# Patient Record
Sex: FEMALE | Race: WHITE | NOT HISPANIC OR LATINO | Employment: FULL TIME | URBAN - METROPOLITAN AREA
[De-identification: names, ages, dates, MRNs, and addresses within clinical notes are randomized per-mention and may not be internally consistent; named-entity substitution may affect disease eponyms.]

---

## 2017-07-06 ENCOUNTER — APPOINTMENT (OUTPATIENT)
Dept: WOMENS IMAGING | Facility: HOSPITAL | Age: 57
End: 2017-07-06

## 2017-07-06 PROCEDURE — 77067 SCR MAMMO BI INCL CAD: CPT | Performed by: RADIOLOGY

## 2017-09-08 ENCOUNTER — HOSPITAL ENCOUNTER (OUTPATIENT)
Dept: SLEEP MEDICINE | Facility: HOSPITAL | Age: 57
Discharge: HOME | End: 2017-09-08
Admitting: FAMILY MEDICINE

## 2017-09-08 PROCEDURE — G0463 HOSPITAL OUTPT CLINIC VISIT: HCPCS

## 2018-06-12 ENCOUNTER — HOSPITAL ENCOUNTER (OUTPATIENT)
Facility: HOSPITAL | Age: 58
Discharge: HOME | End: 2018-06-13
Attending: INTERNAL MEDICINE | Admitting: INTERNAL MEDICINE

## 2018-06-12 ENCOUNTER — HOSPITAL ENCOUNTER (OUTPATIENT)
Dept: CARDIOLOGY | Facility: HOSPITAL | Age: 58
Discharge: HOME | End: 2018-06-12

## 2018-06-12 VITALS
BODY MASS INDEX: 39.99 KG/M2 | HEIGHT: 65 IN | OXYGEN SATURATION: 96 % | RESPIRATION RATE: 20 BRPM | DIASTOLIC BLOOD PRESSURE: 72 MMHG | WEIGHT: 240 LBS | HEART RATE: 76 BPM | SYSTOLIC BLOOD PRESSURE: 127 MMHG

## 2018-06-12 DIAGNOSIS — R07.2 PRECORDIAL PAIN: Primary | ICD-10-CM

## 2018-06-12 DIAGNOSIS — R07.2 PRECORDIAL PAIN: ICD-10-CM

## 2018-06-12 LAB
ALBUMIN SERPL-MCNC: 3.8 G/DL (ref 3.5–5.2)
ALBUMIN/GLOB SERPL: 1.1 G/DL
ALP SERPL-CCNC: 103 U/L (ref 39–117)
ALT SERPL W P-5'-P-CCNC: 13 U/L (ref 1–33)
ANION GAP SERPL CALCULATED.3IONS-SCNC: 13.4 MMOL/L
AST SERPL-CCNC: 15 U/L (ref 1–32)
BASOPHILS # BLD AUTO: 0.03 10*3/MM3 (ref 0–0.2)
BASOPHILS NFR BLD AUTO: 0.3 % (ref 0–1.5)
BILIRUB SERPL-MCNC: 0.2 MG/DL (ref 0.1–1.2)
BUN BLD-MCNC: 18 MG/DL (ref 6–20)
BUN/CREAT SERPL: 15.8 (ref 7–25)
CALCIUM SPEC-SCNC: 9.1 MG/DL (ref 8.6–10.5)
CHLORIDE SERPL-SCNC: 102 MMOL/L (ref 98–107)
CO2 SERPL-SCNC: 25.6 MMOL/L (ref 22–29)
CREAT BLD-MCNC: 1.14 MG/DL (ref 0.57–1)
D DIMER PPP FEU-MCNC: 0.36 MCGFEU/ML (ref 0–0.49)
DEPRECATED RDW RBC AUTO: 46.9 FL (ref 37–54)
EOSINOPHIL # BLD AUTO: 0.29 10*3/MM3 (ref 0–0.7)
EOSINOPHIL NFR BLD AUTO: 3.1 % (ref 0.3–6.2)
ERYTHROCYTE [DISTWIDTH] IN BLOOD BY AUTOMATED COUNT: 15.4 % (ref 11.7–13)
GFR SERPL CREATININE-BSD FRML MDRD: 49 ML/MIN/1.73
GLOBULIN UR ELPH-MCNC: 3.6 GM/DL
GLUCOSE BLD-MCNC: 111 MG/DL (ref 65–99)
HCT VFR BLD AUTO: 34.9 % (ref 35.6–45.5)
HGB BLD-MCNC: 11.4 G/DL (ref 11.9–15.5)
IMM GRANULOCYTES # BLD: 0.01 10*3/MM3 (ref 0–0.03)
IMM GRANULOCYTES NFR BLD: 0.1 % (ref 0–0.5)
LYMPHOCYTES # BLD AUTO: 2.04 10*3/MM3 (ref 0.9–4.8)
LYMPHOCYTES NFR BLD AUTO: 21.5 % (ref 19.6–45.3)
MCH RBC QN AUTO: 27.4 PG (ref 26.9–32)
MCHC RBC AUTO-ENTMCNC: 32.7 G/DL (ref 32.4–36.3)
MCV RBC AUTO: 83.9 FL (ref 80.5–98.2)
MONOCYTES # BLD AUTO: 0.7 10*3/MM3 (ref 0.2–1.2)
MONOCYTES NFR BLD AUTO: 7.4 % (ref 5–12)
NEUTROPHILS # BLD AUTO: 6.44 10*3/MM3 (ref 1.9–8.1)
NEUTROPHILS NFR BLD AUTO: 67.7 % (ref 42.7–76)
NT-PROBNP SERPL-MCNC: 136.4 PG/ML (ref 5–900)
PLATELET # BLD AUTO: 284 10*3/MM3 (ref 140–500)
PMV BLD AUTO: 10.2 FL (ref 6–12)
POTASSIUM BLD-SCNC: 4 MMOL/L (ref 3.5–5.2)
PROT SERPL-MCNC: 7.4 G/DL (ref 6–8.5)
RBC # BLD AUTO: 4.16 10*6/MM3 (ref 3.9–5.2)
SODIUM BLD-SCNC: 141 MMOL/L (ref 136–145)
TROPONIN T SERPL-MCNC: <0.01 NG/ML (ref 0–0.03)
WBC NRBC COR # BLD: 9.5 10*3/MM3 (ref 4.5–10.7)

## 2018-06-12 PROCEDURE — G0378 HOSPITAL OBSERVATION PER HR: HCPCS

## 2018-06-12 PROCEDURE — 83880 ASSAY OF NATRIURETIC PEPTIDE: CPT | Performed by: INTERNAL MEDICINE

## 2018-06-12 PROCEDURE — 94760 N-INVAS EAR/PLS OXIMETRY 1: CPT

## 2018-06-12 PROCEDURE — 85379 FIBRIN DEGRADATION QUANT: CPT | Performed by: INTERNAL MEDICINE

## 2018-06-12 PROCEDURE — 93005 ELECTROCARDIOGRAM TRACING: CPT | Performed by: INTERNAL MEDICINE

## 2018-06-12 PROCEDURE — 84484 ASSAY OF TROPONIN QUANT: CPT | Performed by: INTERNAL MEDICINE

## 2018-06-12 PROCEDURE — 36415 COLL VENOUS BLD VENIPUNCTURE: CPT

## 2018-06-12 PROCEDURE — 99220 PR INITIAL OBSERVATION CARE/DAY 70 MINUTES: CPT | Performed by: INTERNAL MEDICINE

## 2018-06-12 PROCEDURE — 85025 COMPLETE CBC W/AUTO DIFF WBC: CPT | Performed by: INTERNAL MEDICINE

## 2018-06-12 PROCEDURE — 93010 ELECTROCARDIOGRAM REPORT: CPT | Performed by: INTERNAL MEDICINE

## 2018-06-12 PROCEDURE — 80053 COMPREHEN METABOLIC PANEL: CPT | Performed by: INTERNAL MEDICINE

## 2018-06-12 RX ORDER — ASPIRIN 81 MG/1
81 TABLET ORAL ONCE
Status: COMPLETED | OUTPATIENT
Start: 2018-06-12 | End: 2018-06-12

## 2018-06-12 RX ORDER — DULOXETIN HYDROCHLORIDE 60 MG/1
60 CAPSULE, DELAYED RELEASE ORAL DAILY
Status: DISCONTINUED | OUTPATIENT
Start: 2018-06-12 | End: 2018-06-13 | Stop reason: HOSPADM

## 2018-06-12 RX ORDER — METOPROLOL SUCCINATE 25 MG/1
25 TABLET, EXTENDED RELEASE ORAL DAILY
Status: DISCONTINUED | OUTPATIENT
Start: 2018-06-12 | End: 2018-06-13 | Stop reason: HOSPADM

## 2018-06-12 RX ORDER — NITROGLYCERIN 0.4 MG/1
0.4 TABLET SUBLINGUAL
Status: SHIPPED | OUTPATIENT
Start: 2018-06-12

## 2018-06-12 RX ORDER — SODIUM CHLORIDE 0.9 % (FLUSH) 0.9 %
1-10 SYRINGE (ML) INJECTION AS NEEDED
Status: DISCONTINUED | OUTPATIENT
Start: 2018-06-12 | End: 2018-06-13 | Stop reason: HOSPADM

## 2018-06-12 RX ORDER — SUMATRIPTAN 100 MG/1
50 TABLET, FILM COATED ORAL
Status: DISCONTINUED | OUTPATIENT
Start: 2018-06-12 | End: 2018-06-13 | Stop reason: HOSPADM

## 2018-06-12 RX ORDER — SODIUM CHLORIDE 0.9 % (FLUSH) 0.9 %
10 SYRINGE (ML) INJECTION AS NEEDED
Status: DISCONTINUED | OUTPATIENT
Start: 2018-06-12 | End: 2018-06-13 | Stop reason: HOSPADM

## 2018-06-12 RX ORDER — GABAPENTIN 300 MG/1
300 CAPSULE ORAL 3 TIMES DAILY
Status: DISCONTINUED | OUTPATIENT
Start: 2018-06-12 | End: 2018-06-13 | Stop reason: HOSPADM

## 2018-06-12 RX ORDER — BUSPIRONE HYDROCHLORIDE 5 MG/1
5 TABLET ORAL 2 TIMES DAILY
Status: DISCONTINUED | OUTPATIENT
Start: 2018-06-12 | End: 2018-06-13 | Stop reason: HOSPADM

## 2018-06-12 RX ORDER — AMITRIPTYLINE HYDROCHLORIDE 10 MG/1
10 TABLET, FILM COATED ORAL NIGHTLY
Status: DISCONTINUED | OUTPATIENT
Start: 2018-06-12 | End: 2018-06-13 | Stop reason: HOSPADM

## 2018-06-12 RX ORDER — BUSPIRONE HYDROCHLORIDE 5 MG/1
5 TABLET ORAL 2 TIMES DAILY
COMMUNITY
End: 2019-10-08 | Stop reason: ALTCHOICE

## 2018-06-12 RX ORDER — NITROGLYCERIN 0.4 MG/1
0.4 TABLET SUBLINGUAL
Status: DISCONTINUED | OUTPATIENT
Start: 2018-06-12 | End: 2018-06-13 | Stop reason: HOSPADM

## 2018-06-12 RX ADMIN — BUSPIRONE HYDROCHLORIDE 5 MG: 5 TABLET ORAL at 21:47

## 2018-06-12 RX ADMIN — NITROGLYCERIN 0.05 INCH: 20 OINTMENT TOPICAL at 18:16

## 2018-06-12 RX ADMIN — DULOXETINE HYDROCHLORIDE 60 MG: 60 CAPSULE, DELAYED RELEASE ORAL at 21:47

## 2018-06-12 RX ADMIN — AMITRIPTYLINE HYDROCHLORIDE 10 MG: 10 TABLET, FILM COATED ORAL at 21:47

## 2018-06-12 RX ADMIN — ESTROGENS, CONJUGATED 0.62 MG: 0.62 TABLET, FILM COATED ORAL at 21:47

## 2018-06-12 RX ADMIN — GABAPENTIN 300 MG: 300 CAPSULE ORAL at 18:16

## 2018-06-12 RX ADMIN — NITROGLYCERIN 0.4 MG: 0.4 TABLET SUBLINGUAL at 14:44

## 2018-06-12 RX ADMIN — ASPIRIN 81 MG: 81 TABLET ORAL at 18:18

## 2018-06-12 RX ADMIN — NITROGLYCERIN 0.4 MG: 0.4 TABLET SUBLINGUAL at 15:17

## 2018-06-12 RX ADMIN — METOPROLOL SUCCINATE 25 MG: 25 TABLET, FILM COATED, EXTENDED RELEASE ORAL at 18:17

## 2018-06-13 VITALS
HEART RATE: 62 BPM | TEMPERATURE: 98.1 F | RESPIRATION RATE: 18 BRPM | OXYGEN SATURATION: 93 % | WEIGHT: 244 LBS | DIASTOLIC BLOOD PRESSURE: 78 MMHG | SYSTOLIC BLOOD PRESSURE: 138 MMHG | BODY MASS INDEX: 40.65 KG/M2 | HEIGHT: 65 IN

## 2018-06-13 PROBLEM — R07.2 PRECORDIAL PAIN: Status: ACTIVE | Noted: 2018-06-13

## 2018-06-13 PROCEDURE — 25010000002 HEPARIN (PORCINE) PER 1000 UNITS: Performed by: INTERNAL MEDICINE

## 2018-06-13 PROCEDURE — C1769 GUIDE WIRE: HCPCS | Performed by: INTERNAL MEDICINE

## 2018-06-13 PROCEDURE — 93005 ELECTROCARDIOGRAM TRACING: CPT | Performed by: INTERNAL MEDICINE

## 2018-06-13 PROCEDURE — 93010 ELECTROCARDIOGRAM REPORT: CPT | Performed by: INTERNAL MEDICINE

## 2018-06-13 PROCEDURE — G0378 HOSPITAL OBSERVATION PER HR: HCPCS

## 2018-06-13 PROCEDURE — 99217 PR OBSERVATION CARE DISCHARGE MANAGEMENT: CPT | Performed by: INTERNAL MEDICINE

## 2018-06-13 PROCEDURE — 25010000002 MIDAZOLAM PER 1 MG: Performed by: INTERNAL MEDICINE

## 2018-06-13 PROCEDURE — 94799 UNLISTED PULMONARY SVC/PX: CPT

## 2018-06-13 PROCEDURE — 0 IOPAMIDOL PER 1 ML: Performed by: INTERNAL MEDICINE

## 2018-06-13 PROCEDURE — C1894 INTRO/SHEATH, NON-LASER: HCPCS | Performed by: INTERNAL MEDICINE

## 2018-06-13 PROCEDURE — 93458 L HRT ARTERY/VENTRICLE ANGIO: CPT | Performed by: INTERNAL MEDICINE

## 2018-06-13 PROCEDURE — 25010000002 FENTANYL CITRATE (PF) 100 MCG/2ML SOLUTION: Performed by: INTERNAL MEDICINE

## 2018-06-13 RX ORDER — ONDANSETRON 4 MG/1
4 TABLET, ORALLY DISINTEGRATING ORAL EVERY 6 HOURS PRN
Status: DISCONTINUED | OUTPATIENT
Start: 2018-06-13 | End: 2018-06-13 | Stop reason: HOSPADM

## 2018-06-13 RX ORDER — ONDANSETRON 4 MG/1
4 TABLET, FILM COATED ORAL EVERY 6 HOURS PRN
Status: DISCONTINUED | OUTPATIENT
Start: 2018-06-13 | End: 2018-06-13 | Stop reason: HOSPADM

## 2018-06-13 RX ORDER — SODIUM CHLORIDE 9 MG/ML
100 INJECTION, SOLUTION INTRAVENOUS CONTINUOUS
Status: DISCONTINUED | OUTPATIENT
Start: 2018-06-13 | End: 2018-06-13 | Stop reason: HOSPADM

## 2018-06-13 RX ORDER — ACETAMINOPHEN 325 MG/1
650 TABLET ORAL EVERY 4 HOURS PRN
Status: DISCONTINUED | OUTPATIENT
Start: 2018-06-13 | End: 2018-06-13 | Stop reason: HOSPADM

## 2018-06-13 RX ORDER — ONDANSETRON 2 MG/ML
4 INJECTION INTRAMUSCULAR; INTRAVENOUS EVERY 6 HOURS PRN
Status: DISCONTINUED | OUTPATIENT
Start: 2018-06-13 | End: 2018-06-13 | Stop reason: HOSPADM

## 2018-06-13 RX ORDER — NALOXONE HCL 0.4 MG/ML
0.4 VIAL (ML) INJECTION
Status: DISCONTINUED | OUTPATIENT
Start: 2018-06-13 | End: 2018-06-13 | Stop reason: HOSPADM

## 2018-06-13 RX ORDER — MORPHINE SULFATE 2 MG/ML
1 INJECTION, SOLUTION INTRAMUSCULAR; INTRAVENOUS EVERY 4 HOURS PRN
Status: DISCONTINUED | OUTPATIENT
Start: 2018-06-13 | End: 2018-06-13 | Stop reason: HOSPADM

## 2018-06-13 RX ORDER — LIDOCAINE HYDROCHLORIDE 20 MG/ML
INJECTION, SOLUTION INFILTRATION; PERINEURAL AS NEEDED
Status: DISCONTINUED | OUTPATIENT
Start: 2018-06-13 | End: 2018-06-13 | Stop reason: HOSPADM

## 2018-06-13 RX ORDER — PANTOPRAZOLE SODIUM 40 MG/1
40 TABLET, DELAYED RELEASE ORAL DAILY
Qty: 30 TABLET | Refills: 6 | Status: SHIPPED | OUTPATIENT
Start: 2018-06-13 | End: 2019-10-08 | Stop reason: ALTCHOICE

## 2018-06-13 RX ORDER — SODIUM CHLORIDE 9 MG/ML
INJECTION, SOLUTION INTRAVENOUS CONTINUOUS PRN
Status: COMPLETED | OUTPATIENT
Start: 2018-06-13 | End: 2018-06-13

## 2018-06-13 RX ORDER — FENTANYL CITRATE 50 UG/ML
INJECTION, SOLUTION INTRAMUSCULAR; INTRAVENOUS AS NEEDED
Status: DISCONTINUED | OUTPATIENT
Start: 2018-06-13 | End: 2018-06-13 | Stop reason: HOSPADM

## 2018-06-13 RX ORDER — HYDROCODONE BITARTRATE AND ACETAMINOPHEN 5; 325 MG/1; MG/1
1 TABLET ORAL EVERY 4 HOURS PRN
Status: DISCONTINUED | OUTPATIENT
Start: 2018-06-13 | End: 2018-06-13 | Stop reason: HOSPADM

## 2018-06-13 RX ORDER — MIDAZOLAM HYDROCHLORIDE 1 MG/ML
INJECTION INTRAMUSCULAR; INTRAVENOUS AS NEEDED
Status: DISCONTINUED | OUTPATIENT
Start: 2018-06-13 | End: 2018-06-13 | Stop reason: HOSPADM

## 2018-06-13 RX ADMIN — GABAPENTIN 300 MG: 300 CAPSULE ORAL at 08:46

## 2018-06-13 RX ADMIN — NITROGLYCERIN 0.5 INCH: 20 OINTMENT TOPICAL at 00:50

## 2018-06-13 RX ADMIN — BUSPIRONE HYDROCHLORIDE 5 MG: 5 TABLET ORAL at 08:46

## 2018-06-13 RX ADMIN — NITROGLYCERIN 0.5 INCH: 20 OINTMENT TOPICAL at 06:33

## 2018-06-13 RX ADMIN — METOPROLOL SUCCINATE 25 MG: 25 TABLET, FILM COATED, EXTENDED RELEASE ORAL at 08:46

## 2018-06-25 ENCOUNTER — TELEPHONE (OUTPATIENT)
Dept: CARDIOLOGY | Facility: HOSPITAL | Age: 58
End: 2018-06-25

## 2018-07-03 ENCOUNTER — OFFICE VISIT (OUTPATIENT)
Dept: CARDIOLOGY | Facility: CLINIC | Age: 58
End: 2018-07-03

## 2018-07-03 VITALS
DIASTOLIC BLOOD PRESSURE: 84 MMHG | HEIGHT: 65 IN | WEIGHT: 245 LBS | OXYGEN SATURATION: 98 % | HEART RATE: 69 BPM | SYSTOLIC BLOOD PRESSURE: 124 MMHG | BODY MASS INDEX: 40.82 KG/M2

## 2018-07-03 DIAGNOSIS — I25.10 CORONARY ARTERY DISEASE INVOLVING NATIVE CORONARY ARTERY OF NATIVE HEART WITHOUT ANGINA PECTORIS: ICD-10-CM

## 2018-07-03 DIAGNOSIS — I15.8 OTHER SECONDARY HYPERTENSION: Primary | ICD-10-CM

## 2018-07-03 PROBLEM — I10 HYPERTENSION: Status: ACTIVE | Noted: 2018-07-03

## 2018-07-03 PROCEDURE — 99213 OFFICE O/P EST LOW 20 MIN: CPT | Performed by: INTERNAL MEDICINE

## 2018-09-14 ENCOUNTER — APPOINTMENT (OUTPATIENT)
Dept: SLEEP MEDICINE | Facility: HOSPITAL | Age: 58
End: 2018-09-14
Attending: INTERNAL MEDICINE

## 2018-11-21 ENCOUNTER — APPOINTMENT (OUTPATIENT)
Dept: WOMENS IMAGING | Facility: HOSPITAL | Age: 58
End: 2018-11-21

## 2018-11-21 PROCEDURE — 77063 BREAST TOMOSYNTHESIS BI: CPT | Performed by: RADIOLOGY

## 2018-11-21 PROCEDURE — 77067 SCR MAMMO BI INCL CAD: CPT | Performed by: RADIOLOGY

## 2019-10-08 ENCOUNTER — OFFICE VISIT (OUTPATIENT)
Dept: CARDIOLOGY | Facility: CLINIC | Age: 59
End: 2019-10-08

## 2019-10-08 VITALS
DIASTOLIC BLOOD PRESSURE: 86 MMHG | SYSTOLIC BLOOD PRESSURE: 134 MMHG | BODY MASS INDEX: 39.82 KG/M2 | HEIGHT: 65 IN | WEIGHT: 239 LBS | OXYGEN SATURATION: 98 % | HEART RATE: 78 BPM

## 2019-10-08 DIAGNOSIS — Z01.810 PREOP CARDIOVASCULAR EXAM: ICD-10-CM

## 2019-10-08 DIAGNOSIS — I10 ESSENTIAL HYPERTENSION: Primary | ICD-10-CM

## 2019-10-08 PROCEDURE — 99213 OFFICE O/P EST LOW 20 MIN: CPT | Performed by: INTERNAL MEDICINE

## 2019-10-08 RX ORDER — BUSPIRONE HYDROCHLORIDE 15 MG/1
15 TABLET ORAL DAILY
COMMUNITY

## 2019-10-08 RX ORDER — AMITRIPTYLINE HYDROCHLORIDE 25 MG/1
25 TABLET, FILM COATED ORAL DAILY
COMMUNITY
Start: 2019-01-15

## 2019-10-11 PROCEDURE — 93000 ELECTROCARDIOGRAM COMPLETE: CPT | Performed by: INTERNAL MEDICINE

## 2020-11-02 ENCOUNTER — APPOINTMENT (OUTPATIENT)
Dept: WOMENS IMAGING | Facility: HOSPITAL | Age: 60
End: 2020-11-02

## 2020-11-02 PROCEDURE — 77063 BREAST TOMOSYNTHESIS BI: CPT | Performed by: RADIOLOGY

## 2020-11-02 PROCEDURE — 77067 SCR MAMMO BI INCL CAD: CPT | Performed by: RADIOLOGY

## 2020-11-11 ENCOUNTER — OFFICE VISIT (OUTPATIENT)
Dept: CARDIOLOGY | Facility: CLINIC | Age: 60
End: 2020-11-11

## 2020-11-11 VITALS
HEIGHT: 64 IN | BODY MASS INDEX: 42.17 KG/M2 | HEART RATE: 75 BPM | DIASTOLIC BLOOD PRESSURE: 62 MMHG | SYSTOLIC BLOOD PRESSURE: 104 MMHG | WEIGHT: 247 LBS

## 2020-11-11 DIAGNOSIS — I10 ESSENTIAL HYPERTENSION: Primary | ICD-10-CM

## 2020-11-11 PROCEDURE — 99214 OFFICE O/P EST MOD 30 MIN: CPT | Performed by: INTERNAL MEDICINE

## 2020-11-11 PROCEDURE — 93000 ELECTROCARDIOGRAM COMPLETE: CPT | Performed by: INTERNAL MEDICINE

## 2020-11-11 RX ORDER — ESTRADIOL 0.05 MG/D
FILM, EXTENDED RELEASE TRANSDERMAL
COMMUNITY
Start: 2020-11-02

## 2020-11-16 ENCOUNTER — TELEPHONE (OUTPATIENT)
Dept: CARDIOLOGY | Facility: CLINIC | Age: 60
End: 2020-11-16

## 2020-11-16 DIAGNOSIS — I25.10 CORONARY ARTERY DISEASE INVOLVING NATIVE CORONARY ARTERY OF NATIVE HEART WITHOUT ANGINA PECTORIS: Primary | ICD-10-CM

## 2020-12-02 ENCOUNTER — TELEPHONE (OUTPATIENT)
Dept: CARDIOLOGY | Facility: CLINIC | Age: 60
End: 2020-12-02

## 2020-12-02 DIAGNOSIS — I25.10 CORONARY ARTERY DISEASE INVOLVING NATIVE CORONARY ARTERY OF NATIVE HEART WITHOUT ANGINA PECTORIS: Primary | ICD-10-CM

## 2020-12-24 ENCOUNTER — TRANSCRIBE ORDERS (OUTPATIENT)
Dept: SLEEP MEDICINE | Facility: HOSPITAL | Age: 60
End: 2020-12-24

## 2020-12-24 DIAGNOSIS — Z01.818 OTHER SPECIFIED PRE-OPERATIVE EXAMINATION: Primary | ICD-10-CM

## 2020-12-26 ENCOUNTER — LAB (OUTPATIENT)
Dept: LAB | Facility: HOSPITAL | Age: 60
End: 2020-12-26

## 2020-12-26 DIAGNOSIS — Z01.818 OTHER SPECIFIED PRE-OPERATIVE EXAMINATION: ICD-10-CM

## 2020-12-26 PROCEDURE — C9803 HOPD COVID-19 SPEC COLLECT: HCPCS

## 2020-12-26 PROCEDURE — U0004 COV-19 TEST NON-CDC HGH THRU: HCPCS

## 2020-12-28 LAB — SARS-COV-2 RNA RESP QL NAA+PROBE: NOT DETECTED

## 2020-12-29 ENCOUNTER — HOSPITAL ENCOUNTER (OUTPATIENT)
Dept: CARDIOLOGY | Facility: HOSPITAL | Age: 60
Discharge: HOME | End: 2020-12-29
Admitting: INTERNAL MEDICINE

## 2020-12-29 VITALS — WEIGHT: 246 LBS | BODY MASS INDEX: 42 KG/M2 | HEIGHT: 64 IN

## 2020-12-29 DIAGNOSIS — I25.10 CORONARY ARTERY DISEASE INVOLVING NATIVE CORONARY ARTERY OF NATIVE HEART WITHOUT ANGINA PECTORIS: ICD-10-CM

## 2020-12-29 LAB
BH CV NUCLEAR PRIOR STUDY: 2
BH CV STRESS BP STAGE 1: NORMAL
BH CV STRESS COMMENTS STAGE 1: NORMAL
BH CV STRESS DOSE REGADENOSON STAGE 1: 0.4
BH CV STRESS DURATION MIN STAGE 1: 0
BH CV STRESS DURATION SEC STAGE 1: 10
BH CV STRESS HR STAGE 1: 105
BH CV STRESS PROTOCOL 1: NORMAL
BH CV STRESS RECOVERY BP: NORMAL MMHG
BH CV STRESS RECOVERY HR: 83 BPM
BH CV STRESS STAGE 1: 1
LV EF NUC BP: 57 %
MAXIMAL PREDICTED HEART RATE: 159 BPM
PERCENT MAX PREDICTED HR: 66.04 %
STRESS BASELINE BP: NORMAL MMHG
STRESS BASELINE HR: 72 BPM
STRESS PERCENT HR: 78 %
STRESS POST EXERCISE DUR SEC: 10 SEC
STRESS POST PEAK BP: NORMAL MMHG
STRESS POST PEAK HR: 105 BPM
STRESS TARGET HR: 135 BPM

## 2020-12-29 PROCEDURE — 93018 CV STRESS TEST I&R ONLY: CPT | Performed by: INTERNAL MEDICINE

## 2020-12-29 PROCEDURE — 93017 CV STRESS TEST TRACING ONLY: CPT

## 2020-12-29 PROCEDURE — 78452 HT MUSCLE IMAGE SPECT MULT: CPT | Performed by: INTERNAL MEDICINE

## 2020-12-29 PROCEDURE — A9502 TC99M TETROFOSMIN: HCPCS | Performed by: INTERNAL MEDICINE

## 2020-12-29 PROCEDURE — 93016 CV STRESS TEST SUPVJ ONLY: CPT | Performed by: INTERNAL MEDICINE

## 2020-12-29 PROCEDURE — 25010000002 REGADENOSON 0.4 MG/5ML SOLUTION: Performed by: INTERNAL MEDICINE

## 2020-12-29 PROCEDURE — 0 TECHNETIUM TETROFOSMIN KIT: Performed by: INTERNAL MEDICINE

## 2020-12-29 PROCEDURE — 78452 HT MUSCLE IMAGE SPECT MULT: CPT

## 2020-12-29 RX ADMIN — REGADENOSON 0.4 MG: 0.08 INJECTION, SOLUTION INTRAVENOUS at 08:09

## 2020-12-29 RX ADMIN — TETROFOSMIN 1 DOSE: 1.38 INJECTION, POWDER, LYOPHILIZED, FOR SOLUTION INTRAVENOUS at 07:20

## 2020-12-29 RX ADMIN — TETROFOSMIN 1 DOSE: 1.38 INJECTION, POWDER, LYOPHILIZED, FOR SOLUTION INTRAVENOUS at 08:09

## 2021-03-06 ENCOUNTER — IMMUNIZATION (OUTPATIENT)
Dept: VACCINE CLINIC | Facility: HOSPITAL | Age: 61
End: 2021-03-06

## 2021-03-06 PROCEDURE — 91300 HC SARSCOV02 VAC 30MCG/0.3ML IM: CPT | Performed by: INTERNAL MEDICINE

## 2021-03-06 PROCEDURE — 0001A: CPT | Performed by: INTERNAL MEDICINE

## 2021-03-27 ENCOUNTER — IMMUNIZATION (OUTPATIENT)
Dept: VACCINE CLINIC | Facility: HOSPITAL | Age: 61
End: 2021-03-27

## 2021-03-27 PROCEDURE — 91300 HC SARSCOV02 VAC 30MCG/0.3ML IM: CPT | Performed by: INTERNAL MEDICINE

## 2021-03-27 PROCEDURE — 0002A: CPT | Performed by: INTERNAL MEDICINE

## 2021-11-27 ENCOUNTER — IMMUNIZATION (OUTPATIENT)
Dept: VACCINE CLINIC | Facility: HOSPITAL | Age: 61
End: 2021-11-27

## 2021-11-27 PROCEDURE — 91300 HC SARSCOV02 VAC 30MCG/0.3ML IM: CPT | Performed by: INTERNAL MEDICINE

## 2021-11-27 PROCEDURE — 0004A ADM SARSCOV2 30MCG/0.3ML BOOSTER: CPT | Performed by: INTERNAL MEDICINE

## 2022-10-03 ENCOUNTER — IMMUNIZATION (OUTPATIENT)
Dept: VACCINE CLINIC | Facility: HOSPITAL | Age: 62
End: 2022-10-03

## 2022-10-03 DIAGNOSIS — Z23 NEED FOR VACCINATION: Primary | ICD-10-CM

## 2022-10-03 PROCEDURE — 91312 HC SARSCOV2 VAC 30MCG/0.3ML IM BIVALENT BOOSTER 12 YRS AND OLDER: CPT | Performed by: INTERNAL MEDICINE

## 2022-10-03 PROCEDURE — 0124A: CPT | Performed by: INTERNAL MEDICINE

## 2022-11-01 ENCOUNTER — TRANSCRIBE ORDERS (OUTPATIENT)
Dept: ADMINISTRATIVE | Facility: HOSPITAL | Age: 62
End: 2022-11-01

## 2022-11-01 ENCOUNTER — LAB (OUTPATIENT)
Dept: LAB | Facility: HOSPITAL | Age: 62
End: 2022-11-01

## 2022-11-01 DIAGNOSIS — N18.32 CHRONIC KIDNEY DISEASE (CKD) STAGE G3B/A1, MODERATELY DECREASED GLOMERULAR FILTRATION RATE (GFR) BETWEEN 30-44 ML/MIN/1.73 SQUARE METER AND ALBUMINURIA CREATININE RATIO LESS THAN 30 MG/G (CMS/H*: ICD-10-CM

## 2022-11-01 DIAGNOSIS — N18.32 CHRONIC KIDNEY DISEASE (CKD) STAGE G3B/A1, MODERATELY DECREASED GLOMERULAR FILTRATION RATE (GFR) BETWEEN 30-44 ML/MIN/1.73 SQUARE METER AND ALBUMINURIA CREATININE RATIO LESS THAN 30 MG/G (CMS/H*: Primary | ICD-10-CM

## 2022-11-01 LAB
ALBUMIN SERPL-MCNC: 4.1 G/DL (ref 3.5–5.2)
ANION GAP SERPL CALCULATED.3IONS-SCNC: 11.1 MMOL/L (ref 5–15)
BACTERIA UR QL AUTO: ABNORMAL /HPF
BILIRUB UR QL STRIP: NEGATIVE
BUN SERPL-MCNC: 12 MG/DL (ref 8–23)
BUN/CREAT SERPL: 9 (ref 7–25)
CALCIUM SPEC-SCNC: 9.2 MG/DL (ref 8.6–10.5)
CHLORIDE SERPL-SCNC: 104 MMOL/L (ref 98–107)
CLARITY UR: CLEAR
CO2 SERPL-SCNC: 27.9 MMOL/L (ref 22–29)
COLOR UR: YELLOW
CREAT SERPL-MCNC: 1.33 MG/DL (ref 0.57–1)
CREAT UR-MCNC: 63.9 MG/DL
EGFRCR SERPLBLD CKD-EPI 2021: 45 ML/MIN/1.73
GLUCOSE SERPL-MCNC: 94 MG/DL (ref 65–99)
GLUCOSE UR STRIP-MCNC: NEGATIVE MG/DL
HGB UR QL STRIP.AUTO: NEGATIVE
HYALINE CASTS UR QL AUTO: ABNORMAL /LPF
KETONES UR QL STRIP: NEGATIVE
LEUKOCYTE ESTERASE UR QL STRIP.AUTO: ABNORMAL
NITRITE UR QL STRIP: NEGATIVE
PH UR STRIP.AUTO: 6 [PH] (ref 5–8)
PHOSPHATE SERPL-MCNC: 3 MG/DL (ref 2.5–4.5)
POTASSIUM SERPL-SCNC: 3.9 MMOL/L (ref 3.5–5.2)
PROT ?TM UR-MCNC: 4.3 MG/DL
PROT UR QL STRIP: NEGATIVE
PROT/CREAT UR: 67.3 MG/G CREA (ref 0–200)
RBC # UR STRIP: ABNORMAL /HPF
REF LAB TEST METHOD: ABNORMAL
SODIUM SERPL-SCNC: 143 MMOL/L (ref 136–145)
SP GR UR STRIP: 1.01 (ref 1–1.03)
SQUAMOUS #/AREA URNS HPF: ABNORMAL /HPF
UROBILINOGEN UR QL STRIP: ABNORMAL
WBC # UR STRIP: ABNORMAL /HPF

## 2022-11-01 PROCEDURE — 82570 ASSAY OF URINE CREATININE: CPT

## 2022-11-01 PROCEDURE — 36415 COLL VENOUS BLD VENIPUNCTURE: CPT

## 2022-11-01 PROCEDURE — 80069 RENAL FUNCTION PANEL: CPT

## 2022-11-01 PROCEDURE — 81001 URINALYSIS AUTO W/SCOPE: CPT

## 2022-11-01 PROCEDURE — 84156 ASSAY OF PROTEIN URINE: CPT

## 2022-12-21 ENCOUNTER — APPOINTMENT (OUTPATIENT)
Dept: WOMENS IMAGING | Facility: HOSPITAL | Age: 62
End: 2022-12-21
Payer: COMMERCIAL

## 2022-12-21 PROCEDURE — 77063 BREAST TOMOSYNTHESIS BI: CPT | Performed by: RADIOLOGY

## 2022-12-21 PROCEDURE — 77067 SCR MAMMO BI INCL CAD: CPT | Performed by: RADIOLOGY

## 2023-04-10 ENCOUNTER — HOSPITAL ENCOUNTER (OUTPATIENT)
Facility: HOSPITAL | Age: 63
Setting detail: OBSERVATION
Discharge: HOME | End: 2023-04-13
Attending: EMERGENCY MEDICINE | Admitting: INTERNAL MEDICINE
Payer: COMMERCIAL

## 2023-04-10 ENCOUNTER — APPOINTMENT (OUTPATIENT)
Dept: CT IMAGING | Facility: HOSPITAL | Age: 63
End: 2023-04-10
Payer: COMMERCIAL

## 2023-04-10 ENCOUNTER — APPOINTMENT (OUTPATIENT)
Dept: GENERAL RADIOLOGY | Facility: HOSPITAL | Age: 63
End: 2023-04-10
Payer: COMMERCIAL

## 2023-04-10 DIAGNOSIS — Z09 FOLLOW-UP EXAM: ICD-10-CM

## 2023-04-10 DIAGNOSIS — R11.2 INTRACTABLE NAUSEA AND VOMITING: ICD-10-CM

## 2023-04-10 DIAGNOSIS — R42 VERTIGO: Primary | ICD-10-CM

## 2023-04-10 DIAGNOSIS — G47.33 OBSTRUCTIVE SLEEP APNEA SYNDROME: ICD-10-CM

## 2023-04-10 LAB
ALBUMIN SERPL-MCNC: 3.8 G/DL (ref 3.5–5.2)
ALBUMIN/GLOB SERPL: 1.1 G/DL
ALP SERPL-CCNC: 114 U/L (ref 39–117)
ALT SERPL W P-5'-P-CCNC: 11 U/L (ref 1–33)
ANION GAP SERPL CALCULATED.3IONS-SCNC: 14.4 MMOL/L (ref 5–15)
AST SERPL-CCNC: 13 U/L (ref 1–32)
BACTERIA UR QL AUTO: NORMAL /HPF
BASOPHILS # BLD AUTO: 0.01 10*3/MM3 (ref 0–0.2)
BASOPHILS NFR BLD AUTO: 0.1 % (ref 0–1.5)
BILIRUB SERPL-MCNC: 0.5 MG/DL (ref 0–1.2)
BILIRUB UR QL STRIP: NEGATIVE
BUN SERPL-MCNC: 10 MG/DL (ref 8–23)
BUN/CREAT SERPL: 9.5 (ref 7–25)
CALCIUM SPEC-SCNC: 9 MG/DL (ref 8.6–10.5)
CHLORIDE SERPL-SCNC: 101 MMOL/L (ref 98–107)
CLARITY UR: CLEAR
CO2 SERPL-SCNC: 23.6 MMOL/L (ref 22–29)
COLOR UR: YELLOW
CREAT SERPL-MCNC: 1.05 MG/DL (ref 0.57–1)
D-LACTATE SERPL-SCNC: 1.2 MMOL/L (ref 0.5–2)
DEPRECATED RDW RBC AUTO: 44.3 FL (ref 37–54)
EGFRCR SERPLBLD CKD-EPI 2021: 59.8 ML/MIN/1.73
EOSINOPHIL # BLD AUTO: 0.16 10*3/MM3 (ref 0–0.4)
EOSINOPHIL NFR BLD AUTO: 1.5 % (ref 0.3–6.2)
ERYTHROCYTE [DISTWIDTH] IN BLOOD BY AUTOMATED COUNT: 14.5 % (ref 12.3–15.4)
GLOBULIN UR ELPH-MCNC: 3.5 GM/DL
GLUCOSE SERPL-MCNC: 101 MG/DL (ref 65–99)
GLUCOSE UR STRIP-MCNC: NEGATIVE MG/DL
HCT VFR BLD AUTO: 38.2 % (ref 34–46.6)
HGB BLD-MCNC: 12.4 G/DL (ref 12–15.9)
HGB UR QL STRIP.AUTO: ABNORMAL
HYALINE CASTS UR QL AUTO: NORMAL /LPF
IMM GRANULOCYTES # BLD AUTO: 0.02 10*3/MM3 (ref 0–0.05)
IMM GRANULOCYTES NFR BLD AUTO: 0.2 % (ref 0–0.5)
KETONES UR QL STRIP: ABNORMAL
LEUKOCYTE ESTERASE UR QL STRIP.AUTO: NEGATIVE
LIPASE SERPL-CCNC: 19 U/L (ref 13–60)
LYMPHOCYTES # BLD AUTO: 1.51 10*3/MM3 (ref 0.7–3.1)
LYMPHOCYTES NFR BLD AUTO: 14.1 % (ref 19.6–45.3)
MAGNESIUM SERPL-MCNC: 2.1 MG/DL (ref 1.6–2.4)
MCH RBC QN AUTO: 27.2 PG (ref 26.6–33)
MCHC RBC AUTO-ENTMCNC: 32.5 G/DL (ref 31.5–35.7)
MCV RBC AUTO: 83.8 FL (ref 79–97)
MONOCYTES # BLD AUTO: 0.85 10*3/MM3 (ref 0.1–0.9)
MONOCYTES NFR BLD AUTO: 7.9 % (ref 5–12)
NEUTROPHILS NFR BLD AUTO: 76.2 % (ref 42.7–76)
NEUTROPHILS NFR BLD AUTO: 8.16 10*3/MM3 (ref 1.7–7)
NITRITE UR QL STRIP: NEGATIVE
PH UR STRIP.AUTO: 7.5 [PH] (ref 5–8)
PLATELET # BLD AUTO: 303 10*3/MM3 (ref 140–450)
PMV BLD AUTO: 9.7 FL (ref 6–12)
POTASSIUM SERPL-SCNC: 3.9 MMOL/L (ref 3.5–5.2)
PROT SERPL-MCNC: 7.3 G/DL (ref 6–8.5)
PROT UR QL STRIP: NEGATIVE
QT INTERVAL: 431 MS
RBC # BLD AUTO: 4.56 10*6/MM3 (ref 3.77–5.28)
RBC # UR STRIP: NORMAL /HPF
REF LAB TEST METHOD: NORMAL
SODIUM SERPL-SCNC: 139 MMOL/L (ref 136–145)
SP GR UR STRIP: 1.01 (ref 1–1.03)
SQUAMOUS #/AREA URNS HPF: NORMAL /HPF
TROPONIN T SERPL HS-MCNC: 12 NG/L
TROPONIN T SERPL HS-MCNC: 13 NG/L
UROBILINOGEN UR QL STRIP: ABNORMAL
WBC # UR STRIP: NORMAL /HPF
WBC NRBC COR # BLD: 10.71 10*3/MM3 (ref 3.4–10.8)

## 2023-04-10 PROCEDURE — 84484 ASSAY OF TROPONIN QUANT: CPT | Performed by: EMERGENCY MEDICINE

## 2023-04-10 PROCEDURE — 85025 COMPLETE CBC W/AUTO DIFF WBC: CPT | Performed by: EMERGENCY MEDICINE

## 2023-04-10 PROCEDURE — 25010000002 LORAZEPAM PER 2 MG: Performed by: EMERGENCY MEDICINE

## 2023-04-10 PROCEDURE — 25510000001 IOPAMIDOL PER 1 ML: Performed by: EMERGENCY MEDICINE

## 2023-04-10 PROCEDURE — 93010 ELECTROCARDIOGRAM REPORT: CPT | Performed by: INTERNAL MEDICINE

## 2023-04-10 PROCEDURE — 81001 URINALYSIS AUTO W/SCOPE: CPT | Performed by: EMERGENCY MEDICINE

## 2023-04-10 PROCEDURE — 93005 ELECTROCARDIOGRAM TRACING: CPT | Performed by: EMERGENCY MEDICINE

## 2023-04-10 PROCEDURE — 99285 EMERGENCY DEPT VISIT HI MDM: CPT

## 2023-04-10 PROCEDURE — 96375 TX/PRO/DX INJ NEW DRUG ADDON: CPT

## 2023-04-10 PROCEDURE — 25010000002 ONDANSETRON PER 1 MG: Performed by: EMERGENCY MEDICINE

## 2023-04-10 PROCEDURE — 80053 COMPREHEN METABOLIC PANEL: CPT | Performed by: EMERGENCY MEDICINE

## 2023-04-10 PROCEDURE — 83735 ASSAY OF MAGNESIUM: CPT | Performed by: EMERGENCY MEDICINE

## 2023-04-10 PROCEDURE — 70450 CT HEAD/BRAIN W/O DYE: CPT

## 2023-04-10 PROCEDURE — 63710000001 PROMETHAZINE PER 25 MG: Performed by: EMERGENCY MEDICINE

## 2023-04-10 PROCEDURE — 71045 X-RAY EXAM CHEST 1 VIEW: CPT

## 2023-04-10 PROCEDURE — 83605 ASSAY OF LACTIC ACID: CPT | Performed by: EMERGENCY MEDICINE

## 2023-04-10 PROCEDURE — 83690 ASSAY OF LIPASE: CPT | Performed by: EMERGENCY MEDICINE

## 2023-04-10 PROCEDURE — 74177 CT ABD & PELVIS W/CONTRAST: CPT

## 2023-04-10 PROCEDURE — 36415 COLL VENOUS BLD VENIPUNCTURE: CPT

## 2023-04-10 PROCEDURE — 96361 HYDRATE IV INFUSION ADD-ON: CPT

## 2023-04-10 RX ORDER — EZETIMIBE 10 MG/1
10 TABLET ORAL DAILY
Qty: 30 TABLET | Refills: 5 | COMMUNITY
Start: 2023-02-28 | End: 2023-04-13 | Stop reason: HOSPADM

## 2023-04-10 RX ORDER — ASPIRIN 81 MG/1
324 TABLET, CHEWABLE ORAL ONCE
Status: COMPLETED | OUTPATIENT
Start: 2023-04-10 | End: 2023-04-10

## 2023-04-10 RX ORDER — FAMOTIDINE 10 MG/ML
20 INJECTION, SOLUTION INTRAVENOUS ONCE
Status: COMPLETED | OUTPATIENT
Start: 2023-04-10 | End: 2023-04-10

## 2023-04-10 RX ORDER — HYDROCODONE BITARTRATE AND ACETAMINOPHEN 5; 325 MG/1; MG/1
1 TABLET ORAL ONCE AS NEEDED
Status: DISCONTINUED | OUTPATIENT
Start: 2023-04-10 | End: 2023-04-11

## 2023-04-10 RX ORDER — LORAZEPAM 2 MG/ML
1 INJECTION INTRAMUSCULAR ONCE
Status: COMPLETED | OUTPATIENT
Start: 2023-04-10 | End: 2023-04-10

## 2023-04-10 RX ORDER — PROMETHAZINE HYDROCHLORIDE 25 MG/1
25 TABLET ORAL EVERY 6 HOURS PRN
COMMUNITY
Start: 2023-02-15

## 2023-04-10 RX ORDER — HYDROCODONE BITARTRATE AND ACETAMINOPHEN 7.5; 325 MG/1; MG/1
1 TABLET ORAL EVERY 6 HOURS PRN
COMMUNITY
Start: 2023-04-07

## 2023-04-10 RX ORDER — ONDANSETRON 2 MG/ML
4 INJECTION INTRAMUSCULAR; INTRAVENOUS ONCE
Status: COMPLETED | OUTPATIENT
Start: 2023-04-10 | End: 2023-04-10

## 2023-04-10 RX ORDER — SODIUM CHLORIDE 0.9 % (FLUSH) 0.9 %
10 SYRINGE (ML) INJECTION AS NEEDED
Status: DISCONTINUED | OUTPATIENT
Start: 2023-04-10 | End: 2023-04-13 | Stop reason: HOSPADM

## 2023-04-10 RX ORDER — ATOGEPANT 60 MG/1
1 TABLET ORAL DAILY
COMMUNITY
Start: 2022-11-23

## 2023-04-10 RX ORDER — PROMETHAZINE HYDROCHLORIDE 25 MG/1
25 TABLET ORAL ONCE
Status: COMPLETED | OUTPATIENT
Start: 2023-04-10 | End: 2023-04-10

## 2023-04-10 RX ORDER — DOXYCYCLINE HYCLATE 100 MG/1
CAPSULE ORAL
COMMUNITY
Start: 2023-02-21 | End: 2023-04-13 | Stop reason: HOSPADM

## 2023-04-10 RX ORDER — MECLIZINE HYDROCHLORIDE 25 MG/1
25 TABLET ORAL ONCE
Status: COMPLETED | OUTPATIENT
Start: 2023-04-10 | End: 2023-04-10

## 2023-04-10 RX ADMIN — FAMOTIDINE 20 MG: 10 INJECTION, SOLUTION INTRAVENOUS at 18:14

## 2023-04-10 RX ADMIN — ASPIRIN 324 MG: 81 TABLET, CHEWABLE ORAL at 22:20

## 2023-04-10 RX ADMIN — IOPAMIDOL 100 ML: 755 INJECTION, SOLUTION INTRAVENOUS at 19:08

## 2023-04-10 RX ADMIN — HYDROCODONE BITARTRATE AND ACETAMINOPHEN 1 TABLET: 5; 325 TABLET ORAL at 22:59

## 2023-04-10 RX ADMIN — ONDANSETRON 4 MG: 2 INJECTION INTRAMUSCULAR; INTRAVENOUS at 18:13

## 2023-04-10 RX ADMIN — PROMETHAZINE HYDROCHLORIDE 25 MG: 25 TABLET ORAL at 19:56

## 2023-04-10 RX ADMIN — MECLIZINE HYDROCHLORIDE 25 MG: 25 TABLET ORAL at 19:56

## 2023-04-10 RX ADMIN — LORAZEPAM 1 MG: 2 INJECTION INTRAMUSCULAR; INTRAVENOUS at 18:16

## 2023-04-10 RX ADMIN — SODIUM CHLORIDE 1000 ML: 9 INJECTION, SOLUTION INTRAVENOUS at 18:28

## 2023-04-11 ENCOUNTER — APPOINTMENT (OUTPATIENT)
Dept: MRI IMAGING | Facility: HOSPITAL | Age: 63
End: 2023-04-11
Payer: COMMERCIAL

## 2023-04-11 ENCOUNTER — APPOINTMENT (OUTPATIENT)
Dept: CARDIOLOGY | Facility: HOSPITAL | Age: 63
End: 2023-04-11
Payer: COMMERCIAL

## 2023-04-11 PROBLEM — G47.33 OBSTRUCTIVE SLEEP APNEA SYNDROME: Status: ACTIVE | Noted: 2023-04-11

## 2023-04-11 PROBLEM — R10.30 LOWER ABDOMINAL PAIN: Status: ACTIVE | Noted: 2023-04-11

## 2023-04-11 PROBLEM — R42 VERTIGO: Status: ACTIVE | Noted: 2023-04-11

## 2023-04-11 PROBLEM — R11.2 NAUSEA & VOMITING: Status: ACTIVE | Noted: 2023-04-11

## 2023-04-11 LAB
ANION GAP SERPL CALCULATED.3IONS-SCNC: 8 MMOL/L (ref 5–15)
AORTIC DIMENSIONLESS INDEX: 0.7 (DI)
BASOPHILS # BLD AUTO: 0.04 10*3/MM3 (ref 0–0.2)
BASOPHILS NFR BLD AUTO: 0.4 % (ref 0–1.5)
BH CV ECHO MEAS - AI P1/2T: 581.1 MSEC
BH CV ECHO MEAS - AO MAX PG: 8.7 MMHG
BH CV ECHO MEAS - AO MEAN PG: 5.3 MMHG
BH CV ECHO MEAS - AO V2 MAX: 147.7 CM/SEC
BH CV ECHO MEAS - AO V2 VTI: 34.4 CM
BH CV ECHO MEAS - AVA(I,D): 1.97 CM2
BH CV ECHO MEAS - EDV(CUBED): 110.2 ML
BH CV ECHO MEAS - EDV(MOD-SP2): 58 ML
BH CV ECHO MEAS - EDV(MOD-SP4): 59 ML
BH CV ECHO MEAS - EF(MOD-BP): 54.3 %
BH CV ECHO MEAS - EF(MOD-SP2): 53.4 %
BH CV ECHO MEAS - EF(MOD-SP4): 55.9 %
BH CV ECHO MEAS - ESV(CUBED): 43 ML
BH CV ECHO MEAS - ESV(MOD-SP2): 27 ML
BH CV ECHO MEAS - ESV(MOD-SP4): 26 ML
BH CV ECHO MEAS - FS: 26.9 %
BH CV ECHO MEAS - IVS/LVPW: 1.21 CM
BH CV ECHO MEAS - IVSD: 1.61 CM
BH CV ECHO MEAS - LAT PEAK E' VEL: 6.6 CM/SEC
BH CV ECHO MEAS - LV DIASTOLIC VOL/BSA (35-75): 26.9 CM2
BH CV ECHO MEAS - LV MASS(C)D: 295.3 GRAMS
BH CV ECHO MEAS - LV MAX PG: 2.7 MMHG
BH CV ECHO MEAS - LV MEAN PG: 1.67 MMHG
BH CV ECHO MEAS - LV SYSTOLIC VOL/BSA (12-30): 11.9 CM2
BH CV ECHO MEAS - LV V1 MAX: 81.4 CM/SEC
BH CV ECHO MEAS - LV V1 VTI: 23.6 CM
BH CV ECHO MEAS - LVIDD: 4.8 CM
BH CV ECHO MEAS - LVIDS: 3.5 CM
BH CV ECHO MEAS - LVOT AREA: 2.9 CM2
BH CV ECHO MEAS - LVOT DIAM: 1.91 CM
BH CV ECHO MEAS - LVPWD: 1.34 CM
BH CV ECHO MEAS - MED PEAK E' VEL: 7.2 CM/SEC
BH CV ECHO MEAS - MV A DUR: 0.12 SEC
BH CV ECHO MEAS - MV A MAX VEL: 106.3 CM/SEC
BH CV ECHO MEAS - MV DEC SLOPE: 335.3 CM/SEC2
BH CV ECHO MEAS - MV DEC TIME: 228 MSEC
BH CV ECHO MEAS - MV E MAX VEL: 81.8 CM/SEC
BH CV ECHO MEAS - MV E/A: 0.77
BH CV ECHO MEAS - MV MAX PG: 5.9 MMHG
BH CV ECHO MEAS - MV MEAN PG: 2 MMHG
BH CV ECHO MEAS - MV P1/2T: 93 MSEC
BH CV ECHO MEAS - MV V2 VTI: 31.1 CM
BH CV ECHO MEAS - MVA(P1/2T): 2.37 CM2
BH CV ECHO MEAS - MVA(VTI): 2.18 CM2
BH CV ECHO MEAS - RAP SYSTOLE: 3 MMHG
BH CV ECHO MEAS - RVSP: 11 MMHG
BH CV ECHO MEAS - SI(MOD-SP2): 14.2 ML/M2
BH CV ECHO MEAS - SI(MOD-SP4): 15.1 ML/M2
BH CV ECHO MEAS - SV(LVOT): 67.8 ML
BH CV ECHO MEAS - SV(MOD-SP2): 31 ML
BH CV ECHO MEAS - SV(MOD-SP4): 33 ML
BH CV ECHO MEAS - TAPSE (>1.6): 3.6 CM
BH CV ECHO MEAS - TR MAX PG: 7.8 MMHG
BH CV ECHO MEAS - TR MAX VEL: 140.1 CM/SEC
BH CV ECHO MEASUREMENTS AVERAGE E/E' RATIO: 11.86
BH CV XLRA - RV BASE: 3.1 CM
BH CV XLRA - RV LENGTH: 6.3 CM
BH CV XLRA - RV MID: 2.1 CM
BUN SERPL-MCNC: 9 MG/DL (ref 8–23)
BUN/CREAT SERPL: 9.9 (ref 7–25)
CALCIUM SPEC-SCNC: 8.2 MG/DL (ref 8.6–10.5)
CHLORIDE SERPL-SCNC: 106 MMOL/L (ref 98–107)
CHOLEST SERPL-MCNC: 170 MG/DL (ref 0–200)
CO2 SERPL-SCNC: 25 MMOL/L (ref 22–29)
CREAT SERPL-MCNC: 0.91 MG/DL (ref 0.57–1)
DEPRECATED RDW RBC AUTO: 41.7 FL (ref 37–54)
EGFRCR SERPLBLD CKD-EPI 2021: 71 ML/MIN/1.73
EOSINOPHIL # BLD AUTO: 0.29 10*3/MM3 (ref 0–0.4)
EOSINOPHIL NFR BLD AUTO: 3.1 % (ref 0.3–6.2)
ERYTHROCYTE [DISTWIDTH] IN BLOOD BY AUTOMATED COUNT: 14.1 % (ref 12.3–15.4)
GLUCOSE BLDC GLUCOMTR-MCNC: 85 MG/DL (ref 70–130)
GLUCOSE BLDC GLUCOMTR-MCNC: 98 MG/DL (ref 70–130)
GLUCOSE SERPL-MCNC: 89 MG/DL (ref 65–99)
HBA1C MFR BLD: 5.7 % (ref 4.8–5.6)
HCT VFR BLD AUTO: 31.8 % (ref 34–46.6)
HDLC SERPL-MCNC: 35 MG/DL (ref 40–60)
HGB BLD-MCNC: 10.5 G/DL (ref 12–15.9)
IMM GRANULOCYTES # BLD AUTO: 0.03 10*3/MM3 (ref 0–0.05)
IMM GRANULOCYTES NFR BLD AUTO: 0.3 % (ref 0–0.5)
INR PPP: 1.07 (ref 0.9–1.1)
LDLC SERPL CALC-MCNC: 111 MG/DL (ref 0–100)
LDLC/HDLC SERPL: 3.09 {RATIO}
LEFT ATRIUM VOLUME INDEX: 21.5 ML/M2
LYMPHOCYTES # BLD AUTO: 2.4 10*3/MM3 (ref 0.7–3.1)
LYMPHOCYTES NFR BLD AUTO: 25.3 % (ref 19.6–45.3)
MAXIMAL PREDICTED HEART RATE: 157 BPM
MCH RBC QN AUTO: 27.2 PG (ref 26.6–33)
MCHC RBC AUTO-ENTMCNC: 33 G/DL (ref 31.5–35.7)
MCV RBC AUTO: 82.4 FL (ref 79–97)
MONOCYTES # BLD AUTO: 0.89 10*3/MM3 (ref 0.1–0.9)
MONOCYTES NFR BLD AUTO: 9.4 % (ref 5–12)
NEUTROPHILS NFR BLD AUTO: 5.83 10*3/MM3 (ref 1.7–7)
NEUTROPHILS NFR BLD AUTO: 61.5 % (ref 42.7–76)
NRBC BLD AUTO-RTO: 0 /100 WBC (ref 0–0.2)
PLATELET # BLD AUTO: 258 10*3/MM3 (ref 140–450)
PMV BLD AUTO: 9.5 FL (ref 6–12)
POTASSIUM SERPL-SCNC: 3.3 MMOL/L (ref 3.5–5.2)
PROTHROMBIN TIME: 14.1 SECONDS (ref 11.7–14.2)
RBC # BLD AUTO: 3.86 10*6/MM3 (ref 3.77–5.28)
SINUS: 2.5 CM
SODIUM SERPL-SCNC: 139 MMOL/L (ref 136–145)
STJ: 2.9 CM
STRESS TARGET HR: 133 BPM
TRIGL SERPL-MCNC: 134 MG/DL (ref 0–150)
VLDLC SERPL-MCNC: 24 MG/DL (ref 5–40)
WBC NRBC COR # BLD: 9.48 10*3/MM3 (ref 3.4–10.8)

## 2023-04-11 PROCEDURE — G0378 HOSPITAL OBSERVATION PER HR: HCPCS

## 2023-04-11 PROCEDURE — 25010000002 PROCHLORPERAZINE 10 MG/2ML SOLUTION: Performed by: INTERNAL MEDICINE

## 2023-04-11 PROCEDURE — 25010000002 METOCLOPRAMIDE PER 10 MG: Performed by: PSYCHIATRY & NEUROLOGY

## 2023-04-11 PROCEDURE — 80061 LIPID PANEL: CPT | Performed by: NURSE PRACTITIONER

## 2023-04-11 PROCEDURE — A9577 INJ MULTIHANCE: HCPCS | Performed by: INTERNAL MEDICINE

## 2023-04-11 PROCEDURE — 96365 THER/PROPH/DIAG IV INF INIT: CPT

## 2023-04-11 PROCEDURE — 97161 PT EVAL LOW COMPLEX 20 MIN: CPT

## 2023-04-11 PROCEDURE — 97535 SELF CARE MNGMENT TRAINING: CPT

## 2023-04-11 PROCEDURE — 0 GADOBENATE DIMEGLUMINE 529 MG/ML SOLUTION: Performed by: INTERNAL MEDICINE

## 2023-04-11 PROCEDURE — 96361 HYDRATE IV INFUSION ADD-ON: CPT

## 2023-04-11 PROCEDURE — 93306 TTE W/DOPPLER COMPLETE: CPT | Performed by: INTERNAL MEDICINE

## 2023-04-11 PROCEDURE — 83036 HEMOGLOBIN GLYCOSYLATED A1C: CPT | Performed by: NURSE PRACTITIONER

## 2023-04-11 PROCEDURE — 93306 TTE W/DOPPLER COMPLETE: CPT

## 2023-04-11 PROCEDURE — 85610 PROTHROMBIN TIME: CPT | Performed by: NURSE PRACTITIONER

## 2023-04-11 PROCEDURE — 25010000002 MORPHINE PER 10 MG: Performed by: INTERNAL MEDICINE

## 2023-04-11 PROCEDURE — 80048 BASIC METABOLIC PNL TOTAL CA: CPT | Performed by: NURSE PRACTITIONER

## 2023-04-11 PROCEDURE — 85025 COMPLETE CBC W/AUTO DIFF WBC: CPT | Performed by: NURSE PRACTITIONER

## 2023-04-11 PROCEDURE — 70553 MRI BRAIN STEM W/O & W/DYE: CPT

## 2023-04-11 PROCEDURE — 96376 TX/PRO/DX INJ SAME DRUG ADON: CPT

## 2023-04-11 PROCEDURE — 25010000002 DIPHENHYDRAMINE PER 50 MG: Performed by: INTERNAL MEDICINE

## 2023-04-11 PROCEDURE — 25010000002 MAGNESIUM SULFATE IN D5W 1G/100ML (PREMIX) 1-5 GM/100ML-% SOLUTION: Performed by: INTERNAL MEDICINE

## 2023-04-11 PROCEDURE — 63710000001 DIPHENHYDRAMINE PER 50 MG: Performed by: PSYCHIATRY & NEUROLOGY

## 2023-04-11 PROCEDURE — 96375 TX/PRO/DX INJ NEW DRUG ADDON: CPT

## 2023-04-11 PROCEDURE — 25010000002 ONDANSETRON PER 1 MG: Performed by: NURSE PRACTITIONER

## 2023-04-11 PROCEDURE — 25010000002 KETOROLAC TROMETHAMINE PER 15 MG: Performed by: PSYCHIATRY & NEUROLOGY

## 2023-04-11 PROCEDURE — 97165 OT EVAL LOW COMPLEX 30 MIN: CPT

## 2023-04-11 PROCEDURE — 25010000002 HYDROMORPHONE 1 MG/ML SOLUTION: Performed by: INTERNAL MEDICINE

## 2023-04-11 PROCEDURE — 96366 THER/PROPH/DIAG IV INF ADDON: CPT

## 2023-04-11 PROCEDURE — 82962 GLUCOSE BLOOD TEST: CPT

## 2023-04-11 RX ORDER — SODIUM CHLORIDE 9 MG/ML
40 INJECTION, SOLUTION INTRAVENOUS AS NEEDED
Status: DISCONTINUED | OUTPATIENT
Start: 2023-04-11 | End: 2023-04-13 | Stop reason: HOSPADM

## 2023-04-11 RX ORDER — ASPIRIN 325 MG
325 TABLET ORAL DAILY
Status: DISCONTINUED | OUTPATIENT
Start: 2023-04-11 | End: 2023-04-13 | Stop reason: HOSPADM

## 2023-04-11 RX ORDER — ACETAMINOPHEN 650 MG/1
650 SUPPOSITORY RECTAL EVERY 4 HOURS PRN
Status: DISCONTINUED | OUTPATIENT
Start: 2023-04-11 | End: 2023-04-13 | Stop reason: HOSPADM

## 2023-04-11 RX ORDER — HYDROMORPHONE HYDROCHLORIDE 1 MG/ML
0.5 INJECTION, SOLUTION INTRAMUSCULAR; INTRAVENOUS; SUBCUTANEOUS ONCE
Status: COMPLETED | OUTPATIENT
Start: 2023-04-11 | End: 2023-04-11

## 2023-04-11 RX ORDER — ACETAMINOPHEN 500 MG
1000 TABLET ORAL ONCE
Status: COMPLETED | OUTPATIENT
Start: 2023-04-11 | End: 2023-04-11

## 2023-04-11 RX ORDER — AMITRIPTYLINE HYDROCHLORIDE 25 MG/1
25 TABLET, FILM COATED ORAL DAILY
Status: DISCONTINUED | OUTPATIENT
Start: 2023-04-11 | End: 2023-04-13 | Stop reason: HOSPADM

## 2023-04-11 RX ORDER — ONDANSETRON 2 MG/ML
4 INJECTION INTRAMUSCULAR; INTRAVENOUS EVERY 6 HOURS PRN
Status: DISCONTINUED | OUTPATIENT
Start: 2023-04-11 | End: 2023-04-13 | Stop reason: HOSPADM

## 2023-04-11 RX ORDER — BUTALBITAL, ACETAMINOPHEN AND CAFFEINE 50; 325; 40 MG/1; MG/1; MG/1
1 TABLET ORAL EVERY 6 HOURS PRN
Status: DISCONTINUED | OUTPATIENT
Start: 2023-04-11 | End: 2023-04-13 | Stop reason: HOSPADM

## 2023-04-11 RX ORDER — ACETAMINOPHEN 325 MG/1
650 TABLET ORAL EVERY 4 HOURS PRN
Status: DISCONTINUED | OUTPATIENT
Start: 2023-04-11 | End: 2023-04-13 | Stop reason: HOSPADM

## 2023-04-11 RX ORDER — ATORVASTATIN CALCIUM 80 MG/1
80 TABLET, FILM COATED ORAL NIGHTLY
Status: DISCONTINUED | OUTPATIENT
Start: 2023-04-11 | End: 2023-04-11

## 2023-04-11 RX ORDER — NITROGLYCERIN 0.4 MG/1
0.4 TABLET SUBLINGUAL
Status: DISCONTINUED | OUTPATIENT
Start: 2023-04-11 | End: 2023-04-13 | Stop reason: HOSPADM

## 2023-04-11 RX ORDER — PROCHLORPERAZINE EDISYLATE 5 MG/ML
10 INJECTION INTRAMUSCULAR; INTRAVENOUS EVERY 6 HOURS PRN
Status: DISCONTINUED | OUTPATIENT
Start: 2023-04-11 | End: 2023-04-11

## 2023-04-11 RX ORDER — METOPROLOL SUCCINATE 25 MG/1
25 TABLET, EXTENDED RELEASE ORAL DAILY
Status: DISCONTINUED | OUTPATIENT
Start: 2023-04-11 | End: 2023-04-13 | Stop reason: HOSPADM

## 2023-04-11 RX ORDER — DIPHENHYDRAMINE HYDROCHLORIDE 50 MG/ML
25 INJECTION INTRAMUSCULAR; INTRAVENOUS EVERY 6 HOURS PRN
Status: DISCONTINUED | OUTPATIENT
Start: 2023-04-11 | End: 2023-04-13 | Stop reason: HOSPADM

## 2023-04-11 RX ORDER — LORAZEPAM 2 MG/ML
1 INJECTION INTRAMUSCULAR ONCE
Status: DISCONTINUED | OUTPATIENT
Start: 2023-04-11 | End: 2023-04-13 | Stop reason: HOSPADM

## 2023-04-11 RX ORDER — SODIUM CHLORIDE 9 MG/ML
100 INJECTION, SOLUTION INTRAVENOUS CONTINUOUS
Status: DISCONTINUED | OUTPATIENT
Start: 2023-04-11 | End: 2023-04-13 | Stop reason: HOSPADM

## 2023-04-11 RX ORDER — GABAPENTIN 300 MG/1
300 CAPSULE ORAL 3 TIMES DAILY
Status: DISCONTINUED | OUTPATIENT
Start: 2023-04-11 | End: 2023-04-13 | Stop reason: HOSPADM

## 2023-04-11 RX ORDER — HYDROCODONE BITARTRATE AND ACETAMINOPHEN 7.5; 325 MG/1; MG/1
1 TABLET ORAL EVERY 4 HOURS PRN
Status: DISCONTINUED | OUTPATIENT
Start: 2023-04-11 | End: 2023-04-13 | Stop reason: HOSPADM

## 2023-04-11 RX ORDER — BUSPIRONE HYDROCHLORIDE 15 MG/1
15 TABLET ORAL 4 TIMES DAILY
Status: DISCONTINUED | OUTPATIENT
Start: 2023-04-11 | End: 2023-04-13 | Stop reason: HOSPADM

## 2023-04-11 RX ORDER — ACETAMINOPHEN 160 MG/5ML
650 SOLUTION ORAL EVERY 4 HOURS PRN
Status: DISCONTINUED | OUTPATIENT
Start: 2023-04-11 | End: 2023-04-13 | Stop reason: HOSPADM

## 2023-04-11 RX ORDER — SODIUM CHLORIDE 0.9 % (FLUSH) 0.9 %
10 SYRINGE (ML) INJECTION AS NEEDED
Status: DISCONTINUED | OUTPATIENT
Start: 2023-04-11 | End: 2023-04-13 | Stop reason: HOSPADM

## 2023-04-11 RX ORDER — HYDROCODONE BITARTRATE AND ACETAMINOPHEN 7.5; 325 MG/1; MG/1
1 TABLET ORAL EVERY 6 HOURS PRN
Status: DISCONTINUED | OUTPATIENT
Start: 2023-04-11 | End: 2023-04-11

## 2023-04-11 RX ORDER — DIPHENHYDRAMINE HCL 25 MG
25 CAPSULE ORAL ONCE
Status: COMPLETED | OUTPATIENT
Start: 2023-04-11 | End: 2023-04-11

## 2023-04-11 RX ORDER — ASPIRIN 300 MG/1
300 SUPPOSITORY RECTAL DAILY
Status: DISCONTINUED | OUTPATIENT
Start: 2023-04-11 | End: 2023-04-13 | Stop reason: HOSPADM

## 2023-04-11 RX ORDER — MORPHINE SULFATE 2 MG/ML
4 INJECTION, SOLUTION INTRAMUSCULAR; INTRAVENOUS ONCE
Status: COMPLETED | OUTPATIENT
Start: 2023-04-11 | End: 2023-04-11

## 2023-04-11 RX ORDER — SODIUM CHLORIDE 0.9 % (FLUSH) 0.9 %
10 SYRINGE (ML) INJECTION EVERY 12 HOURS SCHEDULED
Status: DISCONTINUED | OUTPATIENT
Start: 2023-04-11 | End: 2023-04-13 | Stop reason: HOSPADM

## 2023-04-11 RX ORDER — METOCLOPRAMIDE HYDROCHLORIDE 5 MG/ML
10 INJECTION INTRAMUSCULAR; INTRAVENOUS EVERY 6 HOURS
Status: DISCONTINUED | OUTPATIENT
Start: 2023-04-11 | End: 2023-04-13 | Stop reason: HOSPADM

## 2023-04-11 RX ORDER — MAGNESIUM SULFATE 1 G/100ML
1 INJECTION INTRAVENOUS ONCE
Status: COMPLETED | OUTPATIENT
Start: 2023-04-11 | End: 2023-04-11

## 2023-04-11 RX ORDER — DULOXETIN HYDROCHLORIDE 60 MG/1
60 CAPSULE, DELAYED RELEASE ORAL DAILY
Status: DISCONTINUED | OUTPATIENT
Start: 2023-04-11 | End: 2023-04-13 | Stop reason: HOSPADM

## 2023-04-11 RX ORDER — ATORVASTATIN CALCIUM 20 MG/1
40 TABLET, FILM COATED ORAL NIGHTLY
Status: DISCONTINUED | OUTPATIENT
Start: 2023-04-11 | End: 2023-04-13 | Stop reason: HOSPADM

## 2023-04-11 RX ORDER — KETOROLAC TROMETHAMINE 30 MG/ML
30 INJECTION, SOLUTION INTRAMUSCULAR; INTRAVENOUS ONCE
Status: COMPLETED | OUTPATIENT
Start: 2023-04-11 | End: 2023-04-11

## 2023-04-11 RX ADMIN — MAGNESIUM SULFATE IN DEXTROSE 1 G: 10 INJECTION, SOLUTION INTRAVENOUS at 13:52

## 2023-04-11 RX ADMIN — GABAPENTIN 300 MG: 300 CAPSULE ORAL at 16:03

## 2023-04-11 RX ADMIN — Medication 10 ML: at 20:18

## 2023-04-11 RX ADMIN — KETOROLAC TROMETHAMINE 30 MG: 30 INJECTION, SOLUTION INTRAMUSCULAR; INTRAVENOUS at 18:54

## 2023-04-11 RX ADMIN — DULOXETINE HYDROCHLORIDE 60 MG: 60 CAPSULE, DELAYED RELEASE ORAL at 16:03

## 2023-04-11 RX ADMIN — ASPIRIN 325 MG: 325 TABLET ORAL at 08:49

## 2023-04-11 RX ADMIN — AMITRIPTYLINE HYDROCHLORIDE 25 MG: 25 TABLET, FILM COATED ORAL at 16:03

## 2023-04-11 RX ADMIN — DIPHENHYDRAMINE HYDROCHLORIDE 25 MG: 25 CAPSULE ORAL at 13:52

## 2023-04-11 RX ADMIN — METOCLOPRAMIDE HYDROCHLORIDE 10 MG: 5 INJECTION INTRAMUSCULAR; INTRAVENOUS at 13:52

## 2023-04-11 RX ADMIN — HYDROCODONE BITARTRATE AND ACETAMINOPHEN 1 TABLET: 7.5; 325 TABLET ORAL at 08:50

## 2023-04-11 RX ADMIN — METOPROLOL SUCCINATE 25 MG: 25 TABLET, EXTENDED RELEASE ORAL at 18:55

## 2023-04-11 RX ADMIN — BUSPIRONE HYDROCHLORIDE 15 MG: 15 TABLET ORAL at 18:55

## 2023-04-11 RX ADMIN — ATORVASTATIN CALCIUM 40 MG: 20 TABLET, FILM COATED ORAL at 20:18

## 2023-04-11 RX ADMIN — DIPHENHYDRAMINE HYDROCHLORIDE 25 MG: 50 INJECTION, SOLUTION INTRAMUSCULAR; INTRAVENOUS at 08:49

## 2023-04-11 RX ADMIN — BUSPIRONE HYDROCHLORIDE 15 MG: 15 TABLET ORAL at 20:17

## 2023-04-11 RX ADMIN — BUTALBITAL, ACETAMINOPHEN, AND CAFFEINE 1 TABLET: 50; 325; 40 TABLET ORAL at 13:53

## 2023-04-11 RX ADMIN — BUTALBITAL, ACETAMINOPHEN, AND CAFFEINE 1 TABLET: 50; 325; 40 TABLET ORAL at 20:18

## 2023-04-11 RX ADMIN — ONDANSETRON 4 MG: 2 INJECTION INTRAMUSCULAR; INTRAVENOUS at 03:08

## 2023-04-11 RX ADMIN — Medication 10 ML: at 16:09

## 2023-04-11 RX ADMIN — SODIUM CHLORIDE 100 ML/HR: 9 INJECTION, SOLUTION INTRAVENOUS at 03:08

## 2023-04-11 RX ADMIN — MORPHINE SULFATE 4 MG: 2 INJECTION, SOLUTION INTRAMUSCULAR; INTRAVENOUS at 10:53

## 2023-04-11 RX ADMIN — GADOBENATE DIMEGLUMINE 20 ML: 529 INJECTION, SOLUTION INTRAVENOUS at 15:09

## 2023-04-11 RX ADMIN — SODIUM CHLORIDE 100 ML/HR: 9 INJECTION, SOLUTION INTRAVENOUS at 18:55

## 2023-04-11 RX ADMIN — HYDROCODONE BITARTRATE AND ACETAMINOPHEN 1 TABLET: 7.5; 325 TABLET ORAL at 22:21

## 2023-04-11 RX ADMIN — ACETAMINOPHEN 1000 MG: 500 TABLET ORAL at 10:53

## 2023-04-11 RX ADMIN — GABAPENTIN 300 MG: 300 CAPSULE ORAL at 20:18

## 2023-04-11 RX ADMIN — METOCLOPRAMIDE HYDROCHLORIDE 10 MG: 5 INJECTION INTRAMUSCULAR; INTRAVENOUS at 20:17

## 2023-04-11 RX ADMIN — HYDROMORPHONE HYDROCHLORIDE 0.5 MG: 1 INJECTION, SOLUTION INTRAMUSCULAR; INTRAVENOUS; SUBCUTANEOUS at 13:53

## 2023-04-11 RX ADMIN — DIPHENHYDRAMINE HYDROCHLORIDE 25 MG: 50 INJECTION, SOLUTION INTRAMUSCULAR; INTRAVENOUS at 20:17

## 2023-04-11 RX ADMIN — BUSPIRONE HYDROCHLORIDE 15 MG: 15 TABLET ORAL at 16:03

## 2023-04-11 RX ADMIN — HYDROCODONE BITARTRATE AND ACETAMINOPHEN 1 TABLET: 7.5; 325 TABLET ORAL at 16:04

## 2023-04-11 RX ADMIN — HYDROCODONE BITARTRATE AND ACETAMINOPHEN 1 TABLET: 5; 325 TABLET ORAL at 05:30

## 2023-04-11 RX ADMIN — GABAPENTIN 300 MG: 300 CAPSULE ORAL at 08:50

## 2023-04-11 RX ADMIN — PROCHLORPERAZINE EDISYLATE 10 MG: 5 INJECTION INTRAMUSCULAR; INTRAVENOUS at 08:49

## 2023-04-12 ENCOUNTER — APPOINTMENT (OUTPATIENT)
Dept: OTHER | Facility: HOSPITAL | Age: 63
End: 2023-04-12
Payer: COMMERCIAL

## 2023-04-12 ENCOUNTER — APPOINTMENT (OUTPATIENT)
Dept: MRI IMAGING | Facility: HOSPITAL | Age: 63
End: 2023-04-12
Payer: COMMERCIAL

## 2023-04-12 PROCEDURE — G0378 HOSPITAL OBSERVATION PER HR: HCPCS

## 2023-04-12 PROCEDURE — 0 GADOBENATE DIMEGLUMINE 529 MG/ML SOLUTION: Performed by: INTERNAL MEDICINE

## 2023-04-12 PROCEDURE — 25010000002 DIPHENHYDRAMINE PER 50 MG: Performed by: INTERNAL MEDICINE

## 2023-04-12 PROCEDURE — 70544 MR ANGIOGRAPHY HEAD W/O DYE: CPT

## 2023-04-12 PROCEDURE — 25010000002 ONDANSETRON PER 1 MG: Performed by: NURSE PRACTITIONER

## 2023-04-12 PROCEDURE — 96376 TX/PRO/DX INJ SAME DRUG ADON: CPT

## 2023-04-12 PROCEDURE — A9577 INJ MULTIHANCE: HCPCS | Performed by: INTERNAL MEDICINE

## 2023-04-12 PROCEDURE — 70549 MR ANGIOGRAPH NECK W/O&W/DYE: CPT

## 2023-04-12 PROCEDURE — 25010000002 METOCLOPRAMIDE PER 10 MG: Performed by: PSYCHIATRY & NEUROLOGY

## 2023-04-12 PROCEDURE — 96361 HYDRATE IV INFUSION ADD-ON: CPT

## 2023-04-12 RX ORDER — MECLIZINE HYDROCHLORIDE 25 MG/1
25 TABLET ORAL 3 TIMES DAILY PRN
Status: DISCONTINUED | OUTPATIENT
Start: 2023-04-12 | End: 2023-04-13 | Stop reason: HOSPADM

## 2023-04-12 RX ORDER — POTASSIUM CHLORIDE 1.5 G/1.77G
40 POWDER, FOR SOLUTION ORAL ONCE
Status: COMPLETED | OUTPATIENT
Start: 2023-04-12 | End: 2023-04-12

## 2023-04-12 RX ADMIN — DIPHENHYDRAMINE HYDROCHLORIDE 25 MG: 50 INJECTION, SOLUTION INTRAMUSCULAR; INTRAVENOUS at 02:11

## 2023-04-12 RX ADMIN — METOCLOPRAMIDE HYDROCHLORIDE 10 MG: 5 INJECTION INTRAMUSCULAR; INTRAVENOUS at 22:13

## 2023-04-12 RX ADMIN — METOCLOPRAMIDE HYDROCHLORIDE 10 MG: 5 INJECTION INTRAMUSCULAR; INTRAVENOUS at 08:27

## 2023-04-12 RX ADMIN — GABAPENTIN 300 MG: 300 CAPSULE ORAL at 16:07

## 2023-04-12 RX ADMIN — METOCLOPRAMIDE HYDROCHLORIDE 10 MG: 5 INJECTION INTRAMUSCULAR; INTRAVENOUS at 13:55

## 2023-04-12 RX ADMIN — BUTALBITAL, ACETAMINOPHEN, AND CAFFEINE 1 TABLET: 50; 325; 40 TABLET ORAL at 08:27

## 2023-04-12 RX ADMIN — SODIUM CHLORIDE 100 ML/HR: 9 INJECTION, SOLUTION INTRAVENOUS at 02:14

## 2023-04-12 RX ADMIN — BUSPIRONE HYDROCHLORIDE 15 MG: 15 TABLET ORAL at 12:10

## 2023-04-12 RX ADMIN — HYDROCODONE BITARTRATE AND ACETAMINOPHEN 1 TABLET: 7.5; 325 TABLET ORAL at 13:55

## 2023-04-12 RX ADMIN — DULOXETINE HYDROCHLORIDE 60 MG: 60 CAPSULE, DELAYED RELEASE ORAL at 08:27

## 2023-04-12 RX ADMIN — BUSPIRONE HYDROCHLORIDE 15 MG: 15 TABLET ORAL at 22:13

## 2023-04-12 RX ADMIN — GABAPENTIN 300 MG: 300 CAPSULE ORAL at 22:12

## 2023-04-12 RX ADMIN — GABAPENTIN 300 MG: 300 CAPSULE ORAL at 08:27

## 2023-04-12 RX ADMIN — POTASSIUM CHLORIDE 40 MEQ: 1.5 POWDER, FOR SOLUTION ORAL at 10:47

## 2023-04-12 RX ADMIN — Medication 10 ML: at 22:12

## 2023-04-12 RX ADMIN — ONDANSETRON 4 MG: 2 INJECTION INTRAMUSCULAR; INTRAVENOUS at 12:10

## 2023-04-12 RX ADMIN — ATORVASTATIN CALCIUM 40 MG: 20 TABLET, FILM COATED ORAL at 22:13

## 2023-04-12 RX ADMIN — HYDROCODONE BITARTRATE AND ACETAMINOPHEN 1 TABLET: 7.5; 325 TABLET ORAL at 22:12

## 2023-04-12 RX ADMIN — ONDANSETRON 4 MG: 2 INJECTION INTRAMUSCULAR; INTRAVENOUS at 17:45

## 2023-04-12 RX ADMIN — METOCLOPRAMIDE HYDROCHLORIDE 10 MG: 5 INJECTION INTRAMUSCULAR; INTRAVENOUS at 02:11

## 2023-04-12 RX ADMIN — AMITRIPTYLINE HYDROCHLORIDE 25 MG: 25 TABLET, FILM COATED ORAL at 08:27

## 2023-04-12 RX ADMIN — BUSPIRONE HYDROCHLORIDE 15 MG: 15 TABLET ORAL at 08:27

## 2023-04-12 RX ADMIN — ASPIRIN 325 MG: 325 TABLET ORAL at 08:27

## 2023-04-12 RX ADMIN — METOPROLOL SUCCINATE 25 MG: 25 TABLET, EXTENDED RELEASE ORAL at 08:27

## 2023-04-12 RX ADMIN — BUSPIRONE HYDROCHLORIDE 15 MG: 15 TABLET ORAL at 17:45

## 2023-04-12 RX ADMIN — DIPHENHYDRAMINE HYDROCHLORIDE 25 MG: 50 INJECTION, SOLUTION INTRAMUSCULAR; INTRAVENOUS at 08:27

## 2023-04-12 RX ADMIN — HYDROCODONE BITARTRATE AND ACETAMINOPHEN 1 TABLET: 7.5; 325 TABLET ORAL at 06:53

## 2023-04-12 RX ADMIN — BUTALBITAL, ACETAMINOPHEN, AND CAFFEINE 1 TABLET: 50; 325; 40 TABLET ORAL at 02:11

## 2023-04-12 RX ADMIN — GADOBENATE DIMEGLUMINE 20 ML: 529 INJECTION, SOLUTION INTRAVENOUS at 21:19

## 2023-04-12 RX ADMIN — MECLIZINE HYDROCHLORIDE 25 MG: 25 TABLET ORAL at 12:35

## 2023-04-12 RX ADMIN — Medication 10 ML: at 08:27

## 2023-04-13 VITALS
BODY MASS INDEX: 44.56 KG/M2 | HEIGHT: 64 IN | WEIGHT: 261 LBS | TEMPERATURE: 98.5 F | OXYGEN SATURATION: 97 % | RESPIRATION RATE: 20 BRPM | HEART RATE: 60 BPM | DIASTOLIC BLOOD PRESSURE: 80 MMHG | SYSTOLIC BLOOD PRESSURE: 154 MMHG

## 2023-04-13 PROCEDURE — G0378 HOSPITAL OBSERVATION PER HR: HCPCS

## 2023-04-13 PROCEDURE — 96376 TX/PRO/DX INJ SAME DRUG ADON: CPT

## 2023-04-13 PROCEDURE — 25010000002 METOCLOPRAMIDE PER 10 MG: Performed by: PSYCHIATRY & NEUROLOGY

## 2023-04-13 PROCEDURE — 25010000002 ONDANSETRON PER 1 MG: Performed by: NURSE PRACTITIONER

## 2023-04-13 RX ORDER — BUTALBITAL, ACETAMINOPHEN AND CAFFEINE 50; 325; 40 MG/1; MG/1; MG/1
2 TABLET ORAL EVERY 6 HOURS PRN
Qty: 10 TABLET | Refills: 0 | Status: SHIPPED | OUTPATIENT
Start: 2023-04-13

## 2023-04-13 RX ORDER — ATORVASTATIN CALCIUM 40 MG/1
40 TABLET, FILM COATED ORAL NIGHTLY
Qty: 30 TABLET | Refills: 0 | Status: SHIPPED | OUTPATIENT
Start: 2023-04-13

## 2023-04-13 RX ORDER — MECLIZINE HYDROCHLORIDE 25 MG/1
25 TABLET ORAL 3 TIMES DAILY PRN
Qty: 20 TABLET | Refills: 0 | Status: SHIPPED | OUTPATIENT
Start: 2023-04-13

## 2023-04-13 RX ADMIN — BUTALBITAL, ACETAMINOPHEN, AND CAFFEINE 1 TABLET: 50; 325; 40 TABLET ORAL at 05:58

## 2023-04-13 RX ADMIN — HYDROCODONE BITARTRATE AND ACETAMINOPHEN 1 TABLET: 7.5; 325 TABLET ORAL at 08:21

## 2023-04-13 RX ADMIN — SODIUM CHLORIDE 100 ML/HR: 9 INJECTION, SOLUTION INTRAVENOUS at 00:32

## 2023-04-13 RX ADMIN — ONDANSETRON 4 MG: 2 INJECTION INTRAMUSCULAR; INTRAVENOUS at 00:32

## 2023-04-13 RX ADMIN — DULOXETINE HYDROCHLORIDE 60 MG: 60 CAPSULE, DELAYED RELEASE ORAL at 08:34

## 2023-04-13 RX ADMIN — BUSPIRONE HYDROCHLORIDE 15 MG: 15 TABLET ORAL at 14:49

## 2023-04-13 RX ADMIN — METOCLOPRAMIDE HYDROCHLORIDE 10 MG: 5 INJECTION INTRAMUSCULAR; INTRAVENOUS at 02:57

## 2023-04-13 RX ADMIN — METOCLOPRAMIDE HYDROCHLORIDE 10 MG: 5 INJECTION INTRAMUSCULAR; INTRAVENOUS at 08:22

## 2023-04-13 RX ADMIN — BUSPIRONE HYDROCHLORIDE 15 MG: 15 TABLET ORAL at 08:34

## 2023-04-13 RX ADMIN — METOCLOPRAMIDE HYDROCHLORIDE 10 MG: 5 INJECTION INTRAMUSCULAR; INTRAVENOUS at 14:49

## 2023-04-13 RX ADMIN — ASPIRIN 325 MG: 325 TABLET ORAL at 08:21

## 2023-04-13 RX ADMIN — METOPROLOL SUCCINATE 25 MG: 25 TABLET, EXTENDED RELEASE ORAL at 08:34

## 2023-04-13 RX ADMIN — Medication 10 ML: at 08:35

## 2023-04-13 RX ADMIN — AMITRIPTYLINE HYDROCHLORIDE 25 MG: 25 TABLET, FILM COATED ORAL at 08:34

## 2023-04-13 RX ADMIN — GABAPENTIN 300 MG: 300 CAPSULE ORAL at 08:21

## 2024-09-13 ENCOUNTER — OFFICE VISIT (OUTPATIENT)
Dept: CARDIOLOGY | Facility: CLINIC | Age: 64
End: 2024-09-13
Payer: COMMERCIAL

## 2024-09-13 VITALS
HEART RATE: 64 BPM | WEIGHT: 271 LBS | SYSTOLIC BLOOD PRESSURE: 122 MMHG | DIASTOLIC BLOOD PRESSURE: 70 MMHG | BODY MASS INDEX: 46.26 KG/M2 | HEIGHT: 64 IN

## 2024-09-13 DIAGNOSIS — I10 PRIMARY HYPERTENSION: ICD-10-CM

## 2024-09-13 DIAGNOSIS — I25.10 CORONARY ARTERY DISEASE INVOLVING NATIVE CORONARY ARTERY OF NATIVE HEART WITHOUT ANGINA PECTORIS: Primary | ICD-10-CM

## 2024-09-13 RX ORDER — HYDROXYZINE HYDROCHLORIDE 25 MG/1
TABLET, FILM COATED ORAL
COMMUNITY
Start: 2024-08-19

## 2025-06-06 ENCOUNTER — HOSPITAL ENCOUNTER (INPATIENT)
Facility: HOSPITAL | Age: 65
LOS: 3 days | Discharge: HOME | End: 2025-06-11
Attending: EMERGENCY MEDICINE | Admitting: INTERNAL MEDICINE
Payer: COMMERCIAL

## 2025-06-06 ENCOUNTER — APPOINTMENT (OUTPATIENT)
Dept: CT IMAGING | Facility: HOSPITAL | Age: 65
End: 2025-06-06
Payer: COMMERCIAL

## 2025-06-06 DIAGNOSIS — K52.9 COLITIS: ICD-10-CM

## 2025-06-06 DIAGNOSIS — R11.0 NAUSEA: ICD-10-CM

## 2025-06-06 DIAGNOSIS — R10.10 PAIN OF UPPER ABDOMEN: Primary | ICD-10-CM

## 2025-06-06 DIAGNOSIS — R19.7 DIARRHEA, UNSPECIFIED TYPE: ICD-10-CM

## 2025-06-06 DIAGNOSIS — N17.9 AKI (ACUTE KIDNEY INJURY): ICD-10-CM

## 2025-06-06 LAB
ALBUMIN SERPL-MCNC: 4.2 G/DL (ref 3.5–5.2)
ALBUMIN/GLOB SERPL: 1 G/DL
ALP SERPL-CCNC: 133 U/L (ref 39–117)
ALT SERPL W P-5'-P-CCNC: 33 U/L (ref 1–33)
ANION GAP SERPL CALCULATED.3IONS-SCNC: 15.6 MMOL/L (ref 5–15)
AST SERPL-CCNC: 44 U/L (ref 1–32)
BACTERIA UR QL AUTO: ABNORMAL /HPF
BASOPHILS # BLD AUTO: 0.03 10*3/MM3 (ref 0–0.2)
BASOPHILS NFR BLD AUTO: 0.3 % (ref 0–1.5)
BILIRUB SERPL-MCNC: 0.9 MG/DL (ref 0–1.2)
BILIRUB UR QL STRIP: NEGATIVE
BUN SERPL-MCNC: 15 MG/DL (ref 8–23)
BUN/CREAT SERPL: 10.2 (ref 7–25)
CALCIUM SPEC-SCNC: 10 MG/DL (ref 8.6–10.5)
CHLORIDE SERPL-SCNC: 98 MMOL/L (ref 98–107)
CLARITY UR: CLEAR
CO2 SERPL-SCNC: 26.4 MMOL/L (ref 22–29)
COLOR UR: YELLOW
CREAT SERPL-MCNC: 1.47 MG/DL (ref 0.57–1)
CRP SERPL-MCNC: 0.61 MG/DL (ref 0–0.5)
D-LACTATE SERPL-SCNC: 1 MMOL/L (ref 0.5–2)
D-LACTATE SERPL-SCNC: 3.4 MMOL/L (ref 0.5–2)
DEPRECATED RDW RBC AUTO: 44.4 FL (ref 37–54)
EGFRCR SERPLBLD CKD-EPI 2021: 39.5 ML/MIN/1.73
EOSINOPHIL # BLD AUTO: 0.06 10*3/MM3 (ref 0–0.4)
EOSINOPHIL NFR BLD AUTO: 0.6 % (ref 0.3–6.2)
ERYTHROCYTE [DISTWIDTH] IN BLOOD BY AUTOMATED COUNT: 13.9 % (ref 12.3–15.4)
GLOBULIN UR ELPH-MCNC: 4.4 GM/DL
GLUCOSE SERPL-MCNC: 93 MG/DL (ref 65–99)
GLUCOSE UR STRIP-MCNC: NEGATIVE MG/DL
HCT VFR BLD AUTO: 45.8 % (ref 34–46.6)
HGB BLD-MCNC: 14.5 G/DL (ref 12–15.9)
HGB UR QL STRIP.AUTO: ABNORMAL
HOLD SPECIMEN: NORMAL
HOLD SPECIMEN: NORMAL
HYALINE CASTS UR QL AUTO: ABNORMAL /LPF
IMM GRANULOCYTES # BLD AUTO: 0.04 10*3/MM3 (ref 0–0.05)
IMM GRANULOCYTES NFR BLD AUTO: 0.4 % (ref 0–0.5)
KETONES UR QL STRIP: ABNORMAL
LEUKOCYTE ESTERASE UR QL STRIP.AUTO: ABNORMAL
LIPASE SERPL-CCNC: 29 U/L (ref 13–60)
LYMPHOCYTES # BLD AUTO: 0.53 10*3/MM3 (ref 0.7–3.1)
LYMPHOCYTES NFR BLD AUTO: 4.9 % (ref 19.6–45.3)
MCH RBC QN AUTO: 27.8 PG (ref 26.6–33)
MCHC RBC AUTO-ENTMCNC: 31.7 G/DL (ref 31.5–35.7)
MCV RBC AUTO: 87.7 FL (ref 79–97)
MONOCYTES # BLD AUTO: 0.09 10*3/MM3 (ref 0.1–0.9)
MONOCYTES NFR BLD AUTO: 0.8 % (ref 5–12)
NEUTROPHILS NFR BLD AUTO: 10.1 10*3/MM3 (ref 1.7–7)
NEUTROPHILS NFR BLD AUTO: 93 % (ref 42.7–76)
NITRITE UR QL STRIP: NEGATIVE
NRBC BLD AUTO-RTO: 0 /100 WBC (ref 0–0.2)
PH UR STRIP.AUTO: <=5 [PH] (ref 5–8)
PLATELET # BLD AUTO: 249 10*3/MM3 (ref 140–450)
PMV BLD AUTO: 9.9 FL (ref 6–12)
POTASSIUM SERPL-SCNC: 4.3 MMOL/L (ref 3.5–5.2)
PROT SERPL-MCNC: 8.6 G/DL (ref 6–8.5)
PROT UR QL STRIP: NEGATIVE
RBC # BLD AUTO: 5.22 10*6/MM3 (ref 3.77–5.28)
RBC # UR STRIP: ABNORMAL /HPF
REF LAB TEST METHOD: ABNORMAL
SODIUM SERPL-SCNC: 140 MMOL/L (ref 136–145)
SP GR UR STRIP: >1.03 (ref 1–1.03)
SQUAMOUS #/AREA URNS HPF: ABNORMAL /HPF
UROBILINOGEN UR QL STRIP: ABNORMAL
WBC # UR STRIP: ABNORMAL /HPF
WBC NRBC COR # BLD AUTO: 10.85 10*3/MM3 (ref 3.4–10.8)
WHOLE BLOOD HOLD COAG: NORMAL
WHOLE BLOOD HOLD SPECIMEN: NORMAL

## 2025-06-06 PROCEDURE — G0378 HOSPITAL OBSERVATION PER HR: HCPCS

## 2025-06-06 PROCEDURE — 25010000002 PROCHLORPERAZINE 10 MG/2ML SOLUTION

## 2025-06-06 PROCEDURE — 87040 BLOOD CULTURE FOR BACTERIA: CPT | Performed by: EMERGENCY MEDICINE

## 2025-06-06 PROCEDURE — 36415 COLL VENOUS BLD VENIPUNCTURE: CPT

## 2025-06-06 PROCEDURE — 85025 COMPLETE CBC W/AUTO DIFF WBC: CPT | Performed by: EMERGENCY MEDICINE

## 2025-06-06 PROCEDURE — 25010000002 ONDANSETRON PER 1 MG: Performed by: EMERGENCY MEDICINE

## 2025-06-06 PROCEDURE — 74177 CT ABD & PELVIS W/CONTRAST: CPT

## 2025-06-06 PROCEDURE — 25010000002 DROPERIDOL PER 5 MG: Performed by: EMERGENCY MEDICINE

## 2025-06-06 PROCEDURE — 25510000001 IOPAMIDOL 61 % SOLUTION: Performed by: EMERGENCY MEDICINE

## 2025-06-06 PROCEDURE — 25810000003 SODIUM CHLORIDE 0.9 % SOLUTION: Performed by: EMERGENCY MEDICINE

## 2025-06-06 PROCEDURE — 25810000003 SODIUM CHLORIDE 0.9 % SOLUTION: Performed by: INTERNAL MEDICINE

## 2025-06-06 PROCEDURE — 80053 COMPREHEN METABOLIC PANEL: CPT | Performed by: EMERGENCY MEDICINE

## 2025-06-06 PROCEDURE — 81001 URINALYSIS AUTO W/SCOPE: CPT | Performed by: EMERGENCY MEDICINE

## 2025-06-06 PROCEDURE — 83605 ASSAY OF LACTIC ACID: CPT | Performed by: EMERGENCY MEDICINE

## 2025-06-06 PROCEDURE — 99285 EMERGENCY DEPT VISIT HI MDM: CPT

## 2025-06-06 PROCEDURE — 25010000002 MORPHINE PER 10 MG: Performed by: EMERGENCY MEDICINE

## 2025-06-06 PROCEDURE — 25010000002 ONDANSETRON PER 1 MG: Performed by: INTERNAL MEDICINE

## 2025-06-06 PROCEDURE — 86140 C-REACTIVE PROTEIN: CPT | Performed by: EMERGENCY MEDICINE

## 2025-06-06 PROCEDURE — 83690 ASSAY OF LIPASE: CPT | Performed by: EMERGENCY MEDICINE

## 2025-06-06 RX ORDER — DULOXETIN HYDROCHLORIDE 60 MG/1
60 CAPSULE, DELAYED RELEASE ORAL DAILY
Status: DISCONTINUED | OUTPATIENT
Start: 2025-06-06 | End: 2025-06-11 | Stop reason: HOSPADM

## 2025-06-06 RX ORDER — ACETAMINOPHEN 650 MG/1
650 SUPPOSITORY RECTAL EVERY 4 HOURS PRN
Status: DISCONTINUED | OUTPATIENT
Start: 2025-06-06 | End: 2025-06-11 | Stop reason: HOSPADM

## 2025-06-06 RX ORDER — ONDANSETRON 2 MG/ML
4 INJECTION INTRAMUSCULAR; INTRAVENOUS EVERY 6 HOURS PRN
Status: DISCONTINUED | OUTPATIENT
Start: 2025-06-06 | End: 2025-06-11 | Stop reason: HOSPADM

## 2025-06-06 RX ORDER — ATOGEPANT 60 MG/1
60 TABLET ORAL DAILY
COMMUNITY

## 2025-06-06 RX ORDER — BUSPIRONE HYDROCHLORIDE 15 MG/1
15 TABLET ORAL 4 TIMES DAILY
Status: DISCONTINUED | OUTPATIENT
Start: 2025-06-06 | End: 2025-06-11 | Stop reason: HOSPADM

## 2025-06-06 RX ORDER — ALBUTEROL SULFATE 0.83 MG/ML
2.5 SOLUTION RESPIRATORY (INHALATION) EVERY 6 HOURS PRN
Status: DISCONTINUED | OUTPATIENT
Start: 2025-06-06 | End: 2025-06-11 | Stop reason: HOSPADM

## 2025-06-06 RX ORDER — ACETAMINOPHEN 160 MG/5ML
650 SOLUTION ORAL EVERY 4 HOURS PRN
Status: DISCONTINUED | OUTPATIENT
Start: 2025-06-06 | End: 2025-06-11 | Stop reason: HOSPADM

## 2025-06-06 RX ORDER — ACETAMINOPHEN 325 MG/1
650 TABLET ORAL EVERY 4 HOURS PRN
Status: DISCONTINUED | OUTPATIENT
Start: 2025-06-06 | End: 2025-06-11 | Stop reason: HOSPADM

## 2025-06-06 RX ORDER — ONDANSETRON 2 MG/ML
4 INJECTION INTRAMUSCULAR; INTRAVENOUS ONCE
Status: COMPLETED | OUTPATIENT
Start: 2025-06-06 | End: 2025-06-06

## 2025-06-06 RX ORDER — DROPERIDOL 2.5 MG/ML
1.25 INJECTION, SOLUTION INTRAMUSCULAR; INTRAVENOUS ONCE
Status: COMPLETED | OUTPATIENT
Start: 2025-06-06 | End: 2025-06-06

## 2025-06-06 RX ORDER — HYDROCODONE BITARTRATE AND ACETAMINOPHEN 7.5; 325 MG/1; MG/1
1 TABLET ORAL EVERY 6 HOURS PRN
Refills: 0 | Status: DISCONTINUED | OUTPATIENT
Start: 2025-06-06 | End: 2025-06-11 | Stop reason: HOSPADM

## 2025-06-06 RX ORDER — AMITRIPTYLINE HYDROCHLORIDE 50 MG/1
25 TABLET ORAL NIGHTLY
Status: DISCONTINUED | OUTPATIENT
Start: 2025-06-06 | End: 2025-06-11 | Stop reason: HOSPADM

## 2025-06-06 RX ORDER — GABAPENTIN 400 MG/1
400 CAPSULE ORAL 4 TIMES DAILY
Status: DISCONTINUED | OUTPATIENT
Start: 2025-06-06 | End: 2025-06-11 | Stop reason: HOSPADM

## 2025-06-06 RX ORDER — MORPHINE SULFATE 2 MG/ML
4 INJECTION, SOLUTION INTRAMUSCULAR; INTRAVENOUS ONCE
Status: COMPLETED | OUTPATIENT
Start: 2025-06-06 | End: 2025-06-06

## 2025-06-06 RX ORDER — HYOSCYAMINE SULFATE 0.12 MG/1
125 TABLET SUBLINGUAL EVERY 6 HOURS PRN
Status: DISCONTINUED | OUTPATIENT
Start: 2025-06-06 | End: 2025-06-11 | Stop reason: HOSPADM

## 2025-06-06 RX ORDER — METOPROLOL SUCCINATE 25 MG/1
25 TABLET, EXTENDED RELEASE ORAL NIGHTLY
Status: DISCONTINUED | OUTPATIENT
Start: 2025-06-06 | End: 2025-06-11 | Stop reason: HOSPADM

## 2025-06-06 RX ORDER — ONDANSETRON 4 MG/1
4 TABLET, ORALLY DISINTEGRATING ORAL EVERY 6 HOURS PRN
Status: DISCONTINUED | OUTPATIENT
Start: 2025-06-06 | End: 2025-06-11 | Stop reason: HOSPADM

## 2025-06-06 RX ORDER — IOPAMIDOL 612 MG/ML
100 INJECTION, SOLUTION INTRAVASCULAR
Status: COMPLETED | OUTPATIENT
Start: 2025-06-06 | End: 2025-06-06

## 2025-06-06 RX ORDER — HYOSCYAMINE SULFATE 0.125 MG
125 TABLET,DISINTEGRATING ORAL 3 TIMES DAILY PRN
COMMUNITY

## 2025-06-06 RX ORDER — SODIUM CHLORIDE 9 MG/ML
75 INJECTION, SOLUTION INTRAVENOUS CONTINUOUS
Status: ACTIVE | OUTPATIENT
Start: 2025-06-06 | End: 2025-06-07

## 2025-06-06 RX ORDER — SODIUM CHLORIDE 0.9 % (FLUSH) 0.9 %
10 SYRINGE (ML) INJECTION AS NEEDED
Status: DISCONTINUED | OUTPATIENT
Start: 2025-06-06 | End: 2025-06-11 | Stop reason: HOSPADM

## 2025-06-06 RX ORDER — PROCHLORPERAZINE EDISYLATE 5 MG/ML
10 INJECTION INTRAMUSCULAR; INTRAVENOUS ONCE
Status: COMPLETED | OUTPATIENT
Start: 2025-06-06 | End: 2025-06-06

## 2025-06-06 RX ADMIN — IOPAMIDOL 85 ML: 612 INJECTION, SOLUTION INTRAVENOUS at 14:44

## 2025-06-06 RX ADMIN — ONDANSETRON 4 MG: 2 INJECTION, SOLUTION INTRAMUSCULAR; INTRAVENOUS at 18:50

## 2025-06-06 RX ADMIN — DULOXETINE 60 MG: 60 CAPSULE, DELAYED RELEASE ORAL at 23:22

## 2025-06-06 RX ADMIN — GABAPENTIN 400 MG: 400 CAPSULE ORAL at 20:59

## 2025-06-06 RX ADMIN — ACETAMINOPHEN 650 MG: 325 TABLET ORAL at 18:50

## 2025-06-06 RX ADMIN — AMITRIPTYLINE HYDROCHLORIDE 25 MG: 50 TABLET, FILM COATED ORAL at 20:59

## 2025-06-06 RX ADMIN — BUSPIRONE HYDROCHLORIDE 15 MG: 15 TABLET ORAL at 20:59

## 2025-06-06 RX ADMIN — ONDANSETRON 4 MG: 2 INJECTION, SOLUTION INTRAMUSCULAR; INTRAVENOUS at 13:22

## 2025-06-06 RX ADMIN — MORPHINE SULFATE 4 MG: 2 INJECTION, SOLUTION INTRAMUSCULAR; INTRAVENOUS at 13:23

## 2025-06-06 RX ADMIN — SODIUM CHLORIDE 1000 ML: 9 INJECTION, SOLUTION INTRAVENOUS at 14:35

## 2025-06-06 RX ADMIN — DROPERIDOL 1.25 MG: 2.5 INJECTION, SOLUTION INTRAMUSCULAR; INTRAVENOUS at 14:35

## 2025-06-06 RX ADMIN — SODIUM CHLORIDE 75 ML/HR: 9 INJECTION, SOLUTION INTRAVENOUS at 23:23

## 2025-06-06 RX ADMIN — MORPHINE SULFATE 4 MG: 2 INJECTION, SOLUTION INTRAMUSCULAR; INTRAVENOUS at 15:55

## 2025-06-06 RX ADMIN — PROCHLORPERAZINE EDISYLATE 10 MG: 5 INJECTION, SOLUTION INTRAMUSCULAR; INTRAVENOUS at 23:23

## 2025-06-06 RX ADMIN — SODIUM CHLORIDE 1000 ML: 9 INJECTION, SOLUTION INTRAVENOUS at 13:23

## 2025-06-06 RX ADMIN — HYDROCODONE BITARTRATE AND ACETAMINOPHEN 1 TABLET: 7.5; 325 TABLET ORAL at 20:59

## 2025-06-06 NOTE — ED PROVIDER NOTES
EMERGENCY DEPARTMENT ENCOUNTER    Room Number:  S509/1  PCP: Rolando Elena MD  Independent Historians: Patient and Family    HPI:  Chief Complaint: had concerns including Abdominal Pain, Nausea, and Vomiting.      A complete HPI/ROS/PMH/PSH/SH/FH are unobtainable due to: None    Chronic or social conditions impacting patient care (Social Determinants of Health): None      Context: Rae Hyde is a 64 y.o. female with a medical history of pretension, coronary artery disease, Crohn's disease, colitis, JACKSON, sleep apnea who presents to the ED c/o acute upper abdominal pain with nausea and decreased oral intake since Tuesday.  Patient remarks having lost 11 pounds since Tuesday and having diarrhea.  Patient denies any bloody stools.  She denies any fevers or bad foods.  Patient's prior surgical history includes a hysterectomy and a cholecystectomy as well as a hernia repair with mesh removal thereafter.  Patient does report a history of pancreatitis in the past as well  She remarks being under tremendous stress taking care of her elderly parents.  She is concerned she might have developed an ulcer.        Review of prior external notes (non-ED) -and- Review of prior external test results outside of this encounter: Patient seen by primary provider earlier today and deferred to come to the emergency department for further evaluation due to the severity of her symptoms.    Prescription drug monitoring program review:         PAST MEDICAL HISTORY  Active Ambulatory Problems     Diagnosis Date Noted    Precordial pain 06/13/2018    CAD (coronary artery disease) 07/03/2018    Hypertension 07/03/2018    Vertigo 04/11/2023    Nausea & vomiting 04/11/2023    Lower abdominal pain 04/11/2023    Anemia 10/20/2014    Anxiety 07/28/2015    Crohn's disease 12/03/2012    Fatty liver 10/20/2014    Nonalcoholic steatohepatitis (JACKSON) 12/09/2012    Obstructive sleep apnea syndrome 04/11/2023     Resolved Ambulatory Problems      Diagnosis Date Noted    No Resolved Ambulatory Problems     Past Medical History:   Diagnosis Date    Arthritis     Asthma     Chest pain     Depression     Hyperlipidemia     Migraine     Sleep apnea     Snoring          PAST SURGICAL HISTORY  Past Surgical History:   Procedure Laterality Date    ABDOMINAL SURGERY      CARDIAC CATHETERIZATION      CARDIAC CATHETERIZATION Left 6/13/2018    Procedure: Cardiac Catheterization/Vascular Study;  Surgeon: Sukh Fisher MD;  Location:  [facility];  Service: Cardiovascular    CARDIAC CATHETERIZATION N/A 6/13/2018    Procedure: Left Heart Cath;  Surgeon: Sukh Fisher MD;  Location:  [facility];  Service: Cardiovascular    CARDIAC CATHETERIZATION N/A 6/13/2018    Procedure: Coronary angiography;  Surgeon: Sukh Fisher MD;  Location:  [facility];  Service: Cardiovascular    CARDIAC CATHETERIZATION N/A 6/13/2018    Procedure: Left ventriculography;  Surgeon: Sukh Fisher MD;  Location:  [facility];  Service: Cardiovascular    CHOLECYSTECTOMY      HERNIA REPAIR      HYSTERECTOMY      JOINT REPLACEMENT      KNEE SURGERY           FAMILY HISTORY  Family History   Problem Relation Age of Onset    Hypertension Mother     Atrial fibrillation Mother     Hypertension Father     Diabetes Maternal Grandmother     Diabetes Maternal Grandfather     Diabetes Paternal Grandmother     Diabetes Paternal Grandfather          SOCIAL HISTORY  Social History     Socioeconomic History    Marital status:    Tobacco Use    Smoking status: Former     Current packs/day: 0.25     Average packs/day: 0.3 packs/day for 1 year (0.3 ttl pk-yrs)     Types: Cigarettes    Smokeless tobacco: Never    Tobacco comments:     caffeine use   Vaping Use    Vaping status: Never Used   Substance and Sexual Activity    Alcohol use: No    Drug use: No    Sexual activity: Defer         ALLERGIES  Latex, Peg  [id number] bicarb-nacl, Pravastatin, and Simvastatin        REVIEW OF SYSTEMS  Review of Systems  Included in HPI  All systems reviewed and negative except for those discussed in HPI.      PHYSICAL EXAM    I have reviewed the triage vital signs and nursing notes.    ED Triage Vitals   Temp Heart Rate Resp BP SpO2   06/06/25 1221 06/06/25 1221 06/06/25 1221 06/06/25 1228 06/06/25 1221   98.2 °F (36.8 °C) 106 14 156/86 96 %      Temp src Heart Rate Source Patient Position BP Location FiO2 (%)   06/06/25 1221 06/06/25 1221 06/06/25 1228 -- --   Oral Monitor Sitting         Physical Exam  GENERAL: Cooperative but ill-appearing obese female, conversant, alert, no acute distress  SKIN: Warm, dry, generalized pallor  HENT: Normocephalic, atraumatic  EYES: no scleral icterus  CV: regular rhythm, accelerated rate  RESPIRATORY: normal effort, diminished at the bases with no wheezing and no rhonchi  ABDOMEN: soft, diffuse upper abdominal tenderness to palpation with no rebound and no guarding, obese, nondistended  MUSCULOSKELETAL: no deformity  NEURO: alert, moves all extremities, follows commands                                                                   LAB RESULTS  Recent Results (from the past 24 hours)   Comprehensive Metabolic Panel    Collection Time: 06/06/25  1:22 PM    Specimen: Blood   Result Value Ref Range    Glucose 93 65 - 99 mg/dL    BUN 15.0 8.0 - 23.0 mg/dL    Creatinine 1.47 (H) 0.57 - 1.00 mg/dL    Sodium 140 136 - 145 mmol/L    Potassium 4.3 3.5 - 5.2 mmol/L    Chloride 98 98 - 107 mmol/L    CO2 26.4 22.0 - 29.0 mmol/L    Calcium 10.0 8.6 - 10.5 mg/dL    Total Protein 8.6 (H) 6.0 - 8.5 g/dL    Albumin 4.2 3.5 - 5.2 g/dL    ALT (SGPT) 33 1 - 33 U/L    AST (SGOT) 44 (H) 1 - 32 U/L    Alkaline Phosphatase 133 (H) 39 - 117 U/L    Total Bilirubin 0.9 0.0 - 1.2 mg/dL    Globulin 4.4 gm/dL    A/G Ratio 1.0 g/dL    BUN/Creatinine Ratio 10.2 7.0 - 25.0    Anion Gap 15.6 (H) 5.0 - 15.0 mmol/L    eGFR 39.5  (L) >60.0 mL/min/1.73   Lipase    Collection Time: 06/06/25  1:22 PM    Specimen: Blood   Result Value Ref Range    Lipase 29 13 - 60 U/L   Lactic Acid, Plasma    Collection Time: 06/06/25  1:22 PM    Specimen: Blood   Result Value Ref Range    Lactate 3.4 (C) 0.5 - 2.0 mmol/L   Green Top (Gel)    Collection Time: 06/06/25  1:22 PM   Result Value Ref Range    Extra Tube Hold for add-ons.    Lavender Top    Collection Time: 06/06/25  1:22 PM   Result Value Ref Range    Extra Tube hold for add-on    Gold Top - SST    Collection Time: 06/06/25  1:22 PM   Result Value Ref Range    Extra Tube Hold for add-ons.    Light Blue Top    Collection Time: 06/06/25  1:22 PM   Result Value Ref Range    Extra Tube Hold for add-ons.    CBC Auto Differential    Collection Time: 06/06/25  1:22 PM    Specimen: Blood   Result Value Ref Range    WBC 10.85 (H) 3.40 - 10.80 10*3/mm3    RBC 5.22 3.77 - 5.28 10*6/mm3    Hemoglobin 14.5 12.0 - 15.9 g/dL    Hematocrit 45.8 34.0 - 46.6 %    MCV 87.7 79.0 - 97.0 fL    MCH 27.8 26.6 - 33.0 pg    MCHC 31.7 31.5 - 35.7 g/dL    RDW 13.9 12.3 - 15.4 %    RDW-SD 44.4 37.0 - 54.0 fl    MPV 9.9 6.0 - 12.0 fL    Platelets 249 140 - 450 10*3/mm3    Neutrophil % 93.0 (H) 42.7 - 76.0 %    Lymphocyte % 4.9 (L) 19.6 - 45.3 %    Monocyte % 0.8 (L) 5.0 - 12.0 %    Eosinophil % 0.6 0.3 - 6.2 %    Basophil % 0.3 0.0 - 1.5 %    Immature Grans % 0.4 0.0 - 0.5 %    Neutrophils, Absolute 10.10 (H) 1.70 - 7.00 10*3/mm3    Lymphocytes, Absolute 0.53 (L) 0.70 - 3.10 10*3/mm3    Monocytes, Absolute 0.09 (L) 0.10 - 0.90 10*3/mm3    Eosinophils, Absolute 0.06 0.00 - 0.40 10*3/mm3    Basophils, Absolute 0.03 0.00 - 0.20 10*3/mm3    Immature Grans, Absolute 0.04 0.00 - 0.05 10*3/mm3    nRBC 0.0 0.0 - 0.2 /100 WBC   C-reactive Protein    Collection Time: 06/06/25  1:22 PM    Specimen: Blood   Result Value Ref Range    C-Reactive Protein 0.61 (H) 0.00 - 0.50 mg/dL   STAT Lactic Acid, Reflex    Collection Time: 06/06/25  4:14  PM    Specimen: Arm, Left; Blood   Result Value Ref Range    Lactate 1.0 0.5 - 2.0 mmol/L   Urinalysis With Microscopic If Indicated (No Culture) - Urine, Clean Catch    Collection Time: 06/06/25  5:51 PM    Specimen: Urine, Clean Catch   Result Value Ref Range    Color, UA Yellow Yellow, Straw    Appearance, UA Clear Clear    pH, UA <=5.0 5.0 - 8.0    Specific Gravity, UA >1.030 (H) 1.005 - 1.030    Glucose, UA Negative Negative    Ketones, UA 15 mg/dL (1+) (A) Negative    Bilirubin, UA Negative Negative    Blood, UA Moderate (2+) (A) Negative    Protein, UA Negative Negative    Leuk Esterase, UA Small (1+) (A) Negative    Nitrite, UA Negative Negative    Urobilinogen, UA 0.2 E.U./dL 0.2 - 1.0 E.U./dL   Urinalysis, Microscopic Only - Urine, Clean Catch    Collection Time: 06/06/25  5:51 PM    Specimen: Urine, Clean Catch   Result Value Ref Range    RBC, UA 11-20 (A) None Seen, 0-2 /HPF    WBC, UA 6-10 (A) None Seen, 0-2 /HPF    Bacteria, UA 1+ (A) None Seen /HPF    Squamous Epithelial Cells, UA 13-20 (A) None Seen, 0-2 /HPF    Hyaline Casts, UA 0-2 None Seen /LPF    Methodology Automated Microscopy    Basic Metabolic Panel    Collection Time: 06/07/25  5:30 AM    Specimen: Blood   Result Value Ref Range    Glucose 101 (H) 65 - 99 mg/dL    BUN 15.0 8.0 - 23.0 mg/dL    Creatinine 1.24 (H) 0.57 - 1.00 mg/dL    Sodium 138 136 - 145 mmol/L    Potassium 3.6 3.5 - 5.2 mmol/L    Chloride 105 98 - 107 mmol/L    CO2 23.1 22.0 - 29.0 mmol/L    Calcium 8.2 (L) 8.6 - 10.5 mg/dL    BUN/Creatinine Ratio 12.1 7.0 - 25.0    Anion Gap 9.9 5.0 - 15.0 mmol/L    eGFR 48.4 (L) >60.0 mL/min/1.73   CBC (No Diff)    Collection Time: 06/07/25  5:30 AM    Specimen: Blood   Result Value Ref Range    WBC 9.81 3.40 - 10.80 10*3/mm3    RBC 3.90 3.77 - 5.28 10*6/mm3    Hemoglobin 11.0 (L) 12.0 - 15.9 g/dL    Hematocrit 33.4 (L) 34.0 - 46.6 %    MCV 85.6 79.0 - 97.0 fL    MCH 28.2 26.6 - 33.0 pg    MCHC 32.9 31.5 - 35.7 g/dL    RDW 13.7 12.3 -  15.4 %    RDW-SD 42.8 37.0 - 54.0 fl    MPV 9.5 6.0 - 12.0 fL    Platelets 195 140 - 450 10*3/mm3         RADIOLOGY  CT Abdomen Pelvis With Contrast  Result Date: 6/6/2025  CT ABDOMEN PELVIS W CONTRAST-  INDICATION: Upper abdominal pain, with nausea and diarrhea  COMPARISON: CT abdomen pelvis April 10, 2023  TECHNIQUE: Routine CT abdomen and pelvis with IV contrast. Coronal and sagittal reformats. Radiation dose reduction techniques were utilized, including automated exposure control and exposure modulation based on body size.  FINDINGS:  Lung bases: Subsegmental atelectasis at the bases.  ABDOMEN: Normal liver. Cholecystectomy. No biliary ductal dilatation. Spleen is normal in size. Mild to moderate pancreatic atrophy. No pancreatic ductal dilatation or mass seen. No adrenal nodules. No solid-appearing renal mass or hydronephrosis.  Pelvis: No bladder calculus. Hysterectomy. No adnexal mass. Surgical clips in the pelvis.  Bowel: Small lipoma at the duodenal jejunal flexure, series 2, axial mage 50, measures 1.5 cm. No small bowel obstruction. Rectum is collapsed. Gas and fluid in the colonic lumen. Colon is under distended. Suspect a small amount of colonic wall thickening. Normal appendix. Abdominal wall: Rectus diastases. Periumbilical and pelvic wall scarring.  Retroperitoneum: No retroperitoneal lymphadenopathy. Periportal lymphadenopathy. For example, periportal lymph node, series 2, axial mage 40, measures 1.1 cm, unchanged. For example, a portal caval lymph node, series 2, axial mage 45, measures 1.3 cm, unchanged.  Vasculature: Patent. No abdominal aortic aneurysm. Mild aortic atherosclerotic calcification.  Osseous structures: Bilateral femoral head osteophyte lipping. Exaggerated lower lumbar lordosis. Bilateral L5 pars defects with slight anterolisthesis of L5 on S1. Lower lumbar facet degenerative arthropathy.       1. Small amount of colonic wall thickening with gas and fluid in the colonic lumen.  Suspect colitis and diarrhea. 2. Mild periportal adenopathy is similar to prior and therefore most likely reactive. Finding can be correlated with liver function tests.  This report was finalized on 6/6/2025 3:36 PM by Dr. Leon Delacruz M.D on Workstation: [id number]          MEDICATIONS GIVEN IN ER  Medications   sodium chloride 0.9 % flush 10 mL (has no administration in time range)   acetaminophen (TYLENOL) tablet 650 mg (650 mg Oral Given 6/6/25 1850)     Or   acetaminophen (TYLENOL) 160 MG/5ML oral solution 650 mg ( Oral Not Given:  See Alt 6/6/25 1850)     Or   acetaminophen (TYLENOL) suppository 650 mg ( Rectal Not Given:  See Alt 6/6/25 1850)   ondansetron ODT (ZOFRAN-ODT) disintegrating tablet 4 mg ( Oral Not Given:  See Alt 6/7/25 0312)     Or   ondansetron (ZOFRAN) injection 4 mg (4 mg Intravenous Given 6/7/25 0312)   melatonin tablet 2.5 mg (has no administration in time range)   albuterol (PROVENTIL) nebulizer solution 0.083% 2.5 mg/3mL (has no administration in time range)   amitriptyline (ELAVIL) tablet 25 mg (25 mg Oral Given 6/6/25 2059)   busPIRone (BUSPAR) tablet 15 mg (15 mg Oral Given 6/6/25 2059)   DULoxetine (CYMBALTA) DR capsule 60 mg (60 mg Oral Given 6/6/25 2322)   gabapentin (NEURONTIN) capsule 400 mg (400 mg Oral Given 6/6/25 2059)   HYDROcodone-acetaminophen (NORCO) 7.5-325 MG per tablet 1 tablet (1 tablet Oral Given 6/7/25 0312)   hyoscyamine (LEVSIN) SL tablet 125 mcg (125 mcg Sublingual Not Given 6/7/25 0312)   metoprolol succinate XL (TOPROL-XL) 24 hr tablet 25 mg (0 mg Oral Hold 6/6/25 2057)   sodium chloride 0.9 % infusion (75 mL/hr Intravenous Currently Infusing 6/7/25 0705)   sodium chloride 0.9 % bolus 1,000 mL (0 mL Intravenous Stopped 6/6/25 1427)   ondansetron (ZOFRAN) injection 4 mg (4 mg Intravenous Given 6/6/25 1322)   morphine injection 4 mg (4 mg Intravenous Given 6/6/25 1323)   sodium chloride 0.9 % bolus 1,000 mL (0 mL Intravenous Stopped 6/6/25 1615)   droperidol  (INAPSINE) injection 1.25 mg (1.25 mg Intravenous Given 6/6/25 1435)   iopamidol (ISOVUE-300) 61 % injection 100 mL (85 mL Intravenous Given by Other 6/6/25 1444)   morphine injection 4 mg (4 mg Intravenous Given 6/6/25 1555)   prochlorperazine (COMPAZINE) injection 10 mg (10 mg Intravenous Given 6/6/25 2323)         ORDERS PLACED DURING THIS VISIT:  Orders Placed This Encounter   Procedures    Blood Culture - Blood,    Blood Culture - Blood,    Gastrointestinal Panel, PCR - Stool, Per Rectum    Clostridioides difficile Toxin - Stool, Per Rectum    Clostridioides difficile Toxin, PCR - Stool, Per Rectum    CT Abdomen Pelvis With Contrast    [facility] Draw    Comprehensive Metabolic Panel    Lipase    Urinalysis With Microscopic If Indicated (No Culture) - Urine, Clean Catch    Lactic Acid, Plasma    CBC Auto Differential    C-reactive Protein    STAT Lactic Acid, Reflex    Basic Metabolic Panel    CBC (No Diff)    Urinalysis, Microscopic Only - Urine, Clean Catch    Diet: Liquid; Clear Liquid; Fluid Consistency: Thin (IDDSI 0)    Undress & Gown    Vital Signs    Up with assistance    Daily Weights    Strict Intake & Output    Oral Care    Place Sequential Compression Device    Maintain Sequential Compression Device    Code Status and Medical Interventions: CPR (Attempt to Resuscitate); Full    [facility] (on-call MD unless specified) Details    Inpatient Gastroenterology Consult    Patient Isolation Contact Spore    Telemetry Scan    Telemetry Scan    Telemetry Scan    Insert Peripheral IV    Initiate Observation Status    CBC & Differential    Green Top (Gel)    Lavender Top    Gold Top - SST    Light Blue Top         OUTPATIENT MEDICATION MANAGEMENT:  Current Facility-Administered Medications Ordered in Epic   Medication Dose Route Frequency Provider Last Rate Last Admin    acetaminophen (TYLENOL) tablet 650 mg  650 mg Oral Q4H PRN Michaelle Oviedo MD   650 mg at 06/06/25 1850    Or    acetaminophen (TYLENOL) 160  MG/5ML oral solution 650 mg  650 mg Oral Q4H PRN Michaelle Oviedo MD        Or    acetaminophen (TYLENOL) suppository 650 mg  650 mg Rectal Q4H PRN Michaelle Oviedo MD        albuterol (PROVENTIL) nebulizer solution 0.083% 2.5 mg/3mL  2.5 mg Nebulization Q6H PRN Michaelle Oviedo MD        amitriptyline (ELAVIL) tablet 25 mg  25 mg Oral Nightly Michaelle Oviedo MD   25 mg at 06/06/25 2059    busPIRone (BUSPAR) tablet 15 mg  15 mg Oral 4x Daily Michaelle Oviedo MD   15 mg at 06/06/25 2059    DULoxetine (CYMBALTA) DR capsule 60 mg  60 mg Oral Daily Michaelle Oviedo MD   60 mg at 06/06/25 2322    gabapentin (NEURONTIN) capsule 400 mg  400 mg Oral 4x Daily Michaelle Oviedo MD   400 mg at 06/06/25 2059    HYDROcodone-acetaminophen (NORCO) 7.5-325 MG per tablet 1 tablet  1 tablet Oral Q6H PRN Michaelle Oviedo MD   1 tablet at 06/07/25 0312    hyoscyamine (LEVSIN) SL tablet 125 mcg  125 mcg Sublingual Q6H PRN Michaelle Oviedo MD        melatonin tablet 2.5 mg  2.5 mg Oral Nightly PRN Michaelle Oviedo MD        metoprolol succinate XL (TOPROL-XL) 24 hr tablet 25 mg  25 mg Oral Nightly Michaelle Oviedo MD        ondansetron ODT (ZOFRAN-ODT) disintegrating tablet 4 mg  4 mg Oral Q6H PRN Michaelle Oviedo MD        Or    ondansetron (ZOFRAN) injection 4 mg  4 mg Intravenous Q6H PRN Michaelle Oviedo MD   4 mg at 06/07/25 0312    sodium chloride 0.9 % flush 10 mL  10 mL Intravenous PRN Juhi Elmore MD        sodium chloride 0.9 % infusion  75 mL/hr Intravenous Continuous Michaelle Oviedo MD 75 mL/hr at 06/07/25 0705 75 mL/hr at 06/07/25 0705     No current [facility]-ordered outpatient medications on file.         PROCEDURES  Procedures            PROGRESS, DATA ANALYSIS, CONSULTS, AND MEDICAL DECISION MAKING  All labs have been independently interpreted by me.  All radiology studies have been reviewed by me. All EKG's have been independently  viewed and interpreted by me.  Discussion below represents my analysis of pertinent findings related to patient's condition, differential diagnosis, treatment plan and final disposition.    The differential diagnosis for this patient includes: appendicitis, pancreatitis, cholecystitis/biliary colic, PUD, gastritis, pneumonia, ureteral stone, sbo, hernia, colitis, diverticulitis, AAA, malignancy, gastroenteritis, food intolerances. Abdominal exam is without peritoneal signs. There is no evidence of acute abdomen at this time. Plan serum labs CRP and lactic, urinalysis, pain control/IV antiemetics/IV fluids, IMAGING CT abdomen pelvis, and serial reassessment.      ED Course as of 06/07/25 0829   Fri Jun 06, 2025   1551 The workup and findings with the patient and family at bedside.  Answered all questions.  She has an elevated lactic acid, elevated white blood cell count.  She is having persistent abdominal pain with nausea vomiting diarrhea.  Her CT shows colitis.  Plan admission for further management.  They are agreeable.  Is difficult to tell at this time whether the colitis is from an infectious cause or from her Crohn's.  She is receiving IV fluids and we plan repeat lactic acid.  We are sending GI stool for testing.  She is afebrile.  Plan to hold on antibiotics until we have further information. [TR]   1556 Discussing with Dr. Oviedo with A.  She agrees to admit. [TR]      ED Course User Index  [TR] Benjamin Baron MD             AS OF 08:29 EDT VITALS:    BP - 120/54  HR - 85  TEMP - 98.2 °F (36.8 °C) (Oral)  O2 SATS - 95%      COMPLEXITY OF CARE  The patient requires admission.    DIAGNOSIS  Final diagnoses:   Pain of upper abdomen   Nausea   Diarrhea, unspecified type   YECENIA (acute kidney injury)   Colitis         DISPOSITION  ED Disposition       ED Disposition   Decision to Admit    Condition   --    Comment   Level of Care: Telemetry [5]   Diagnosis: Colitis [224523]   Admitting Physician: AL  CARI LEMONS [[id number]]   Attending Physician: CARI MELENDEZ [[id number]]   Is patient appropriate for Inpatient Observation Unit?: Yes [1]                  Please note that portions of this document were completed with a voice recognition program.    Note Disclaimer: At [facility], we believe that sharing information builds trust and better relationships. You are receiving this note because you recently visited [facility]. It is possible you will see health information before a provider has talked with you about it. This kind of information can be easy to misunderstand. To help you fully understand what it means for your health, we urge you to discuss this note with your provider.         Juhi Elmore MD  06/07/25 7030

## 2025-06-06 NOTE — LETTER
June 11, 2025     Patient: Rae Hyde   YOB: 1960   Date of Visit: 6/6/2025       To Whom It May Concern:    It is my medical opinion that Rae Hyde may return to work on June 12, 2025.           Sincerely,        Raman Sharp MD    CC: No Recipients

## 2025-06-06 NOTE — ED NOTES
Nursing report ED to floor  Rae Hyde  64 y.o.  female    HPI :  HPI  Stated Reason for Visit: ABDpain  History Obtained From: patient    Chief Complaint  Chief Complaint   Patient presents with    Abdominal Pain    Nausea    Vomiting       Admitting doctor:   Michaelle Oviedo MD    Admitting diagnosis:   The primary encounter diagnosis was Pain of upper abdomen. Diagnoses of Nausea, Diarrhea, unspecified type, YECENIA (acute kidney injury), and Colitis were also pertinent to this visit.    Code status:   Current Code Status       Date Active Code Status Order ID Comments User Context       6/6/2025 1603 CPR (Attempt to Resuscitate) [id number]  Michaelle Oviedo MD ED        Question Answer    Code Status (Patient has no pulse and is not breathing) CPR (Attempt to Resuscitate)    Medical Interventions (Patient has pulse or is breathing) Full                    Allergies:   Latex, Peg 3350-kcl-na bicarb-nacl, Pravastatin, and Simvastatin    Isolation:   Contact Spore    Intake and Output    Intake/Output Summary (Last 24 hours) at 6/6/2025 1616  Last data filed at 6/6/2025 1615  Gross per 24 hour   Intake 2000 ml   Output --   Net 2000 ml       Weight:   There were no vitals filed for this visit.    Most recent vitals:   Vitals:    06/06/25 1325 06/06/25 1500 06/06/25 1501 06/06/25 1515   BP: 156/69 148/60 126/72 126/72   Patient Position:       Pulse: 107 103 102 101   Resp: 20 18     Temp:  98.4 °F (36.9 °C)     TempSrc:       SpO2: 92% 95% 96% 93%       Active LDAs/IV Access:   Lines, Drains & Airways       Active LDAs       Name Placement date Placement time Site Days    Peripheral IV 06/06/25 1322 20 G Anterior;Distal;Right;Upper Arm 06/06/25  1322  Arm  less than 1                    Labs (abnormal labs have a star):   Labs Reviewed   COMPREHENSIVE METABOLIC PANEL - Abnormal; Notable for the following components:       Result Value    Creatinine 1.47 (*)     Total Protein 8.6 (*)     AST (SGOT) 44 (*)   oriented to person, place and time , normal mood with an appropriate affect    Alkaline Phosphatase 133 (*)     Anion Gap 15.6 (*)     eGFR 39.5 (*)     All other components within normal limits    Narrative:     GFR Categories in Chronic Kidney Disease (CKD)              GFR Category          GFR (mL/min/1.73)    Interpretation  G1                    90 or greater        Normal or high (1)  G2                    60-89                Mild decrease (1)  G3a                   45-59                Mild to moderate decrease  G3b                   30-44                Moderate to severe decrease  G4                    15-29                Severe decrease  G5                    14 or less           Kidney failure    (1)In the absence of evidence of kidney disease, neither GFR category G1 or G2 fulfill the criteria for CKD.    eGFR calculation 2021 CKD-EPI creatinine equation, which does not include race as a factor   LACTIC ACID, PLASMA - Abnormal; Notable for the following components:    Lactate 3.4 (*)     All other components within normal limits   CBC WITH AUTO DIFFERENTIAL - Abnormal; Notable for the following components:    WBC 10.85 (*)     Neutrophil % 93.0 (*)     Lymphocyte % 4.9 (*)     Monocyte % 0.8 (*)     Neutrophils, Absolute 10.10 (*)     Lymphocytes, Absolute 0.53 (*)     Monocytes, Absolute 0.09 (*)     All other components within normal limits   C-REACTIVE PROTEIN - Abnormal; Notable for the following components:    C-Reactive Protein 0.61 (*)     All other components within normal limits   LIPASE - Normal   GASTROINTESTINAL PANEL, PCR (PREFERRED) DOES NOT INCLUDE CDIFF   CLOSTRIDIOIDES DIFFICILE TOXIN    Narrative:     The following orders were created for panel order Clostridioides difficile Toxin - Stool, Per Rectum.  Procedure                               Abnormality         Status                     ---------                               -----------         ------                     Clostridioides difficile...[[id number]]                                                    Please view results for these tests on the individual orders.   CLOSTRIDIOIDES DIFFICILE TOXIN, PCR   BLOOD CULTURE   BLOOD CULTURE   RAINBOW DRAW    Narrative:     The following orders were created for panel order [place] Draw.  Procedure                               Abnormality         Status                     ---------                               -----------         ------                     Green Top (Gel)[[id number]]                                  Final result               Lavender Top[[id number]]                                     Final result               Gold Top - SST[[id number]]                                   Final result               Light Blue Top[[id number]]                                   Final result                 Please view results for these tests on the individual orders.   URINALYSIS W/ MICROSCOPIC IF INDICATED (NO CULTURE)   LACTIC ACID, REFLEX   CBC AND DIFFERENTIAL    Narrative:     The following orders were created for panel order CBC & Differential.  Procedure                               Abnormality         Status                     ---------                               -----------         ------                     CBC Auto Differential[[id number]]        Abnormal            Final result                 Please view results for these tests on the individual orders.   GREEN TOP   LAVENDER TOP   GOLD TOP - SST   LIGHT BLUE TOP       EKG:   No orders to display       Meds given in ED:   Medications   sodium chloride 0.9 % flush 10 mL (has no administration in time range)   acetaminophen (TYLENOL) tablet 650 mg (has no administration in time range)     Or   acetaminophen (TYLENOL) 160 MG/5ML oral solution 650 mg (has no administration in time range)     Or   acetaminophen (TYLENOL) suppository 650 mg (has no administration in time range)   ondansetron ODT (ZOFRAN-ODT) disintegrating tablet 4 mg (has no administration in time range)     Or   ondansetron (ZOFRAN) injection 4  mg (has no administration in time range)   melatonin tablet 2.5 mg (has no administration in time range)   sodium chloride 0.9 % bolus 1,000 mL (0 mL Intravenous Stopped 6/6/25 1427)   ondansetron (ZOFRAN) injection 4 mg (4 mg Intravenous Given 6/6/25 1322)   morphine injection 4 mg (4 mg Intravenous Given 6/6/25 1323)   sodium chloride 0.9 % bolus 1,000 mL (0 mL Intravenous Stopped 6/6/25 1615)   droperidol (INAPSINE) injection 1.25 mg (1.25 mg Intravenous Given 6/6/25 1435)   iopamidol (ISOVUE-300) 61 % injection 100 mL (85 mL Intravenous Given by Other 6/6/25 1444)   morphine injection 4 mg (4 mg Intravenous Given 6/6/25 1555)       Imaging results:  CT Abdomen Pelvis With Contrast  Result Date: 6/6/2025   1. Small amount of colonic wall thickening with gas and fluid in the colonic lumen. Suspect colitis and diarrhea. 2. Mild periportal adenopathy is similar to prior and therefore most likely reactive. Finding can be correlated with liver function tests.  This report was finalized on 6/6/2025 3:36 PM by Dr. Leon Delacruz M.D on Workstation: [id number]        Ambulatory status:   - assist    Social issues:   Social History     Socioeconomic History    Marital status:    Tobacco Use    Smoking status: Former     Current packs/day: 0.25     Average packs/day: 0.3 packs/day for 1 year (0.3 ttl pk-yrs)     Types: Cigarettes    Smokeless tobacco: Never    Tobacco comments:     caffeine use   Vaping Use    Vaping status: Never Used   Substance and Sexual Activity    Alcohol use: No    Drug use: No    Sexual activity: Defer       Peripheral Neurovascular  Peripheral Neurovascular (Adult)  Peripheral Neurovascular WDL: WDL    Neuro Cognitive  Neuro Cognitive (Adult)  Cognitive/Neuro/Behavioral WDL: WDL    Learning  Learning Assessment  Learning Readiness and Ability: no barriers identified    Respiratory  Respiratory  Airway WDL: WDL  Respiratory WDL  Respiratory WDL: WDL    Abdominal Pain       Pain  Assessments  Pain (Adult)  (0-10) Pain Rating: Rest: 9  (0-10) Pain Rating: Activity: 9  Pain Location: abdomen    NIH Stroke Scale       Za Benjamin RN  06/06/25 16:16 EDT

## 2025-06-06 NOTE — PLAN OF CARE
Goal Outcome Evaluation:           Progress: no change  Outcome Evaluation: AxOx4, on O2 @ 2lpm, uses CPAP at home, Cdiff rule out, needs stool. Up ad rubin, screened positive for sepsis, had temp 100 this evening, PRN tylenol given for low grade fever and pain while waiting for [facility] to put orders for her meds and pain pill.Needs attended, will continue to monitor.

## 2025-06-07 LAB
ADV 40+41 DNA STL QL NAA+NON-PROBE: NOT DETECTED
ANION GAP SERPL CALCULATED.3IONS-SCNC: 9.9 MMOL/L (ref 5–15)
ASTRO TYP 1-8 RNA STL QL NAA+NON-PROBE: NOT DETECTED
BUN SERPL-MCNC: 15 MG/DL (ref 8–23)
BUN/CREAT SERPL: 12.1 (ref 7–25)
C CAYETANENSIS DNA STL QL NAA+NON-PROBE: NOT DETECTED
C COLI+JEJ+UPSA DNA STL QL NAA+NON-PROBE: NOT DETECTED
C DIFF TOX GENS STL QL NAA+PROBE: NEGATIVE
CALCIUM SPEC-SCNC: 8.2 MG/DL (ref 8.6–10.5)
CHLORIDE SERPL-SCNC: 105 MMOL/L (ref 98–107)
CO2 SERPL-SCNC: 23.1 MMOL/L (ref 22–29)
CREAT SERPL-MCNC: 1.24 MG/DL (ref 0.57–1)
CRYPTOSP DNA STL QL NAA+NON-PROBE: NOT DETECTED
DEPRECATED RDW RBC AUTO: 42.8 FL (ref 37–54)
E HISTOLYT DNA STL QL NAA+NON-PROBE: NOT DETECTED
EAEC PAA PLAS AGGR+AATA ST NAA+NON-PRB: NOT DETECTED
EC STX1+STX2 GENES STL QL NAA+NON-PROBE: NOT DETECTED
EGFRCR SERPLBLD CKD-EPI 2021: 48.4 ML/MIN/1.73
EPEC EAE GENE STL QL NAA+NON-PROBE: NOT DETECTED
ERYTHROCYTE [DISTWIDTH] IN BLOOD BY AUTOMATED COUNT: 13.7 % (ref 12.3–15.4)
ETEC LTA+ST1A+ST1B TOX ST NAA+NON-PROBE: NOT DETECTED
G LAMBLIA DNA STL QL NAA+NON-PROBE: NOT DETECTED
GLUCOSE SERPL-MCNC: 101 MG/DL (ref 65–99)
HCT VFR BLD AUTO: 33.4 % (ref 34–46.6)
HGB BLD-MCNC: 11 G/DL (ref 12–15.9)
MCH RBC QN AUTO: 28.2 PG (ref 26.6–33)
MCHC RBC AUTO-ENTMCNC: 32.9 G/DL (ref 31.5–35.7)
MCV RBC AUTO: 85.6 FL (ref 79–97)
NOROVIRUS GI+II RNA STL QL NAA+NON-PROBE: NOT DETECTED
P SHIGELLOIDES DNA STL QL NAA+NON-PROBE: NOT DETECTED
PLATELET # BLD AUTO: 195 10*3/MM3 (ref 140–450)
PMV BLD AUTO: 9.5 FL (ref 6–12)
POTASSIUM SERPL-SCNC: 3.6 MMOL/L (ref 3.5–5.2)
RBC # BLD AUTO: 3.9 10*6/MM3 (ref 3.77–5.28)
RVA RNA STL QL NAA+NON-PROBE: NOT DETECTED
S ENT+BONG DNA STL QL NAA+NON-PROBE: NOT DETECTED
SAPO I+II+IV+V RNA STL QL NAA+NON-PROBE: NOT DETECTED
SHIGELLA SP+EIEC IPAH ST NAA+NON-PROBE: NOT DETECTED
SODIUM SERPL-SCNC: 138 MMOL/L (ref 136–145)
V CHOL+PARA+VUL DNA STL QL NAA+NON-PROBE: NOT DETECTED
V CHOLERAE DNA STL QL NAA+NON-PROBE: NOT DETECTED
WBC NRBC COR # BLD AUTO: 9.81 10*3/MM3 (ref 3.4–10.8)
Y ENTEROCOL DNA STL QL NAA+NON-PROBE: NOT DETECTED

## 2025-06-07 PROCEDURE — 87507 IADNA-DNA/RNA PROBE TQ 12-25: CPT | Performed by: INTERNAL MEDICINE

## 2025-06-07 PROCEDURE — 80048 BASIC METABOLIC PNL TOTAL CA: CPT | Performed by: INTERNAL MEDICINE

## 2025-06-07 PROCEDURE — 25010000002 ONDANSETRON PER 1 MG: Performed by: INTERNAL MEDICINE

## 2025-06-07 PROCEDURE — G0378 HOSPITAL OBSERVATION PER HR: HCPCS

## 2025-06-07 PROCEDURE — 25810000003 SODIUM CHLORIDE 0.9 % SOLUTION: Performed by: STUDENT IN AN ORGANIZED HEALTH CARE EDUCATION/TRAINING PROGRAM

## 2025-06-07 PROCEDURE — 87493 C DIFF AMPLIFIED PROBE: CPT | Performed by: INTERNAL MEDICINE

## 2025-06-07 PROCEDURE — 36415 COLL VENOUS BLD VENIPUNCTURE: CPT | Performed by: INTERNAL MEDICINE

## 2025-06-07 PROCEDURE — 85027 COMPLETE CBC AUTOMATED: CPT | Performed by: INTERNAL MEDICINE

## 2025-06-07 PROCEDURE — 63710000001 PROMETHAZINE PER 12.5 MG: Performed by: STUDENT IN AN ORGANIZED HEALTH CARE EDUCATION/TRAINING PROGRAM

## 2025-06-07 PROCEDURE — 99204 OFFICE O/P NEW MOD 45 MIN: CPT | Performed by: INTERNAL MEDICINE

## 2025-06-07 RX ORDER — PROMETHAZINE HYDROCHLORIDE 12.5 MG/1
12.5 TABLET ORAL EVERY 6 HOURS PRN
Status: DISCONTINUED | OUTPATIENT
Start: 2025-06-07 | End: 2025-06-11 | Stop reason: HOSPADM

## 2025-06-07 RX ORDER — SODIUM CHLORIDE 9 MG/ML
75 INJECTION, SOLUTION INTRAVENOUS CONTINUOUS
Status: DISCONTINUED | OUTPATIENT
Start: 2025-06-07 | End: 2025-06-09

## 2025-06-07 RX ORDER — PROMETHAZINE HYDROCHLORIDE 12.5 MG/1
12.5 SUPPOSITORY RECTAL EVERY 6 HOURS PRN
Status: DISCONTINUED | OUTPATIENT
Start: 2025-06-07 | End: 2025-06-11 | Stop reason: HOSPADM

## 2025-06-07 RX ORDER — SODIUM CHLORIDE 9 MG/ML
75 INJECTION, SOLUTION INTRAVENOUS CONTINUOUS
Status: DISCONTINUED | OUTPATIENT
Start: 2025-06-07 | End: 2025-06-08

## 2025-06-07 RX ADMIN — HYDROCODONE BITARTRATE AND ACETAMINOPHEN 1 TABLET: 7.5; 325 TABLET ORAL at 08:39

## 2025-06-07 RX ADMIN — PROMETHAZINE HYDROCHLORIDE 12.5 MG: 12.5 TABLET ORAL at 18:51

## 2025-06-07 RX ADMIN — GABAPENTIN 400 MG: 400 CAPSULE ORAL at 18:03

## 2025-06-07 RX ADMIN — ONDANSETRON 4 MG: 2 INJECTION, SOLUTION INTRAMUSCULAR; INTRAVENOUS at 14:28

## 2025-06-07 RX ADMIN — SODIUM CHLORIDE 75 ML/HR: 9 INJECTION, SOLUTION INTRAVENOUS at 12:51

## 2025-06-07 RX ADMIN — GABAPENTIN 400 MG: 400 CAPSULE ORAL at 12:27

## 2025-06-07 RX ADMIN — AMITRIPTYLINE HYDROCHLORIDE 25 MG: 50 TABLET, FILM COATED ORAL at 21:41

## 2025-06-07 RX ADMIN — HYDROCODONE BITARTRATE AND ACETAMINOPHEN 1 TABLET: 7.5; 325 TABLET ORAL at 03:12

## 2025-06-07 RX ADMIN — BUSPIRONE HYDROCHLORIDE 15 MG: 15 TABLET ORAL at 12:27

## 2025-06-07 RX ADMIN — BUSPIRONE HYDROCHLORIDE 15 MG: 15 TABLET ORAL at 21:44

## 2025-06-07 RX ADMIN — GABAPENTIN 400 MG: 400 CAPSULE ORAL at 21:44

## 2025-06-07 RX ADMIN — BUSPIRONE HYDROCHLORIDE 15 MG: 15 TABLET ORAL at 08:39

## 2025-06-07 RX ADMIN — DULOXETINE 60 MG: 60 CAPSULE, DELAYED RELEASE ORAL at 21:44

## 2025-06-07 RX ADMIN — BUSPIRONE HYDROCHLORIDE 15 MG: 15 TABLET ORAL at 18:03

## 2025-06-07 RX ADMIN — ONDANSETRON 4 MG: 2 INJECTION, SOLUTION INTRAMUSCULAR; INTRAVENOUS at 03:12

## 2025-06-07 RX ADMIN — ONDANSETRON 4 MG: 2 INJECTION, SOLUTION INTRAMUSCULAR; INTRAVENOUS at 08:39

## 2025-06-07 RX ADMIN — HYDROCODONE BITARTRATE AND ACETAMINOPHEN 1 TABLET: 7.5; 325 TABLET ORAL at 16:12

## 2025-06-07 RX ADMIN — GABAPENTIN 400 MG: 400 CAPSULE ORAL at 08:39

## 2025-06-07 RX ADMIN — METOPROLOL SUCCINATE 25 MG: 25 TABLET, EXTENDED RELEASE ORAL at 21:44

## 2025-06-07 NOTE — PLAN OF CARE
Goal Outcome Evaluation:           Progress: no change  Outcome Evaluation: AxOx4, on O2 @2lpm, up ad rubin, on continuous IV fluids, seen by GI, ordered stool test, patient informed. PRN pain meds and antiemetics given.Needs attended, will contonue to monitor;.l

## 2025-06-07 NOTE — H&P
HISTORY AND PHYSICAL   [facility]        Date of Admission: 2025  Patient Identification:  Name: Rae Hyde  Age: 64 y.o.  Sex: female  :  1960  MRN: [id number]                     Primary Care Physician: Rolando Elena MD    Chief Complaint:  64 year old female presented to the emergency room with diarrhea, nausea, vomiting and abdominal pain; she has had some weight loss; she has a history of crohns disease; she reported that she has been very stressed due to taking care of her parents; denies fever or chills; no black or bloody stool    History of Present Illness:   As above    Past Medical History:  Past Medical History:   Diagnosis Date    Arthritis     Asthma     CAD (coronary artery disease)     Chest pain     Crohn's disease     Depression     Hyperlipidemia     Hypertension     Migraine     Precordial pain     Sleep apnea     Snoring      Past Surgical History:  Past Surgical History:   Procedure Laterality Date    ABDOMINAL SURGERY      CARDIAC CATHETERIZATION      CARDIAC CATHETERIZATION Left 2018    Procedure: Cardiac Catheterization/Vascular Study;  Surgeon: Sukh Fisher MD;  Location: [facility] CATH INVASIVE LOC[facility];  Service: Cardiovascular    CARDIAC CATHETERIZATION N/A 2018    Procedure: Left Heart Cath;  Surgeon: Sukh Fisher MD;  Location[facility];  Service: Cardiovascular    CARDIAC CATHETERIZATION N/A 2018    Procedure: Coronary angiography;  Surgeon: Sukh Fisher MD;  [facility];  Service: Cardiovascular    CARDIAC CATHETERIZATION N/A 2018    Procedure: Left ventriculography;  Surgeon: Sukh Fisher MD;  [facility];  Service: Cardiovascular    CHOLECYSTECTOMY      HERNIA REPAIR      HYSTERECTOMY      JOINT REPLACEMENT      KNEE SURGERY        Home Meds:  Facility-Administered Medications Prior to Admission   Medication  Dose Route Frequency Provider Last Rate Last Admin    nitroglycerin (NITROSTAT) SL tablet 0.4 mg  0.4 mg Sublingual Q5 Min PRN Sy Antonio III, MD   0.4 mg at 06/12/18 1517     Medications Prior to Admission   Medication Sig Dispense Refill Last Dose/Taking    amitriptyline (ELAVIL) 25 MG tablet Take 1 tablet by mouth Every Night.   6/5/2025 Evening    busPIRone (BUSPAR) 15 MG tablet Take 1 tablet by mouth 4 (Four) Times a Day.   6/6/2025 Morning    butalbital-acetaminophen-caffeine (FIORICET, ESGIC) -40 MG per tablet Take 2 tablets by mouth Every 6 (Six) Hours As Needed for Headache or Migraine. 10 tablet 0 Past Week    cyanocobalamin 1000 MCG/ML injection    5/15/2025    DULoxetine (CYMBALTA) 60 MG capsule Take 1 capsule by mouth Daily.   6/5/2025 Evening    ezetimibe (ZETIA) 10 MG tablet Take 1 tablet by mouth Daily.   6/5/2025 Evening    gabapentin (NEURONTIN) 400 MG capsule Take 1 capsule by mouth 4 (Four) Times a Day.   6/6/2025 Morning    HYDROcodone-acetaminophen (NORCO) 7.5-325 MG per tablet Take 1 tablet by mouth Every 6 (Six) Hours As Needed.   6/5/2025 Bedtime    hydrOXYzine (ATARAX) 25 MG tablet TAKE ONE TABLET BY MOUTH AT NIGHT IF NEEDED FOR ITCHING   Past Week Bedtime    meclizine (ANTIVERT) 25 MG tablet Take 1 tablet by mouth 3 (Three) Times a Day As Needed for Dizziness or Nausea. 20 tablet 0 Taking As Needed    metoprolol succinate XL (TOPROL-XL) 25 MG 24 hr tablet TAKE 1 TABLET DAILY 90 tablet 0 6/5/2025 Evening    promethazine (PHENERGAN) 25 MG tablet Take 1 tablet by mouth Every 6 (Six) Hours As Needed.   6/6/2025 Morning    ubrogepant (UBRELVY) 100 MG tablet Take 1 tablet by mouth Daily As Needed.   Past Week Morning    albuterol sulfate  (90 Base) MCG/ACT inhaler Inhale 2 puffs.       aspirin 81 MG tablet Take 1 tablet by mouth Daily. (Patient not taking: Reported on 9/13/2024)       hyoscyamine sulfate (ANASPAZ) 0.125 MG tablet dispersible disintegrating tablet Place 1 tablet  on the tongue 3 (Three) Times a Day As Needed.       Qulipta 60 MG tablet Take 1 tablet by mouth Daily.       SUMAtriptan (IMITREX) 50 MG tablet Take one tablet at onset of headache. May repeat dose one time in 2 hours if headache not relieved. (Patient not taking: Reported on 9/13/2024) 10 tablet 1        Allergies:  Allergies   Allergen Reactions    Latex Rash and Hives    Peg 3350-Kcl-Na Bicarb-Nacl Rash and Hives     Possibly container?    Pravastatin Nausea Only    Simvastatin Nausea Only     Immunizations:  Immunization History   Administered Date(s) Administered    COVID-19 (PFIZER) BIVALENT 12+YRS 10/03/2022    COVID-19 (PFIZER) Purple Cap Monovalent 03/06/2021, 03/27/2021, 11/27/2021     Social History:   Social History     Social History Narrative    Not on file     Social History     Socioeconomic History    Marital status:    Tobacco Use    Smoking status: Former     Current packs/day: 0.25     Average packs/day: 0.3 packs/day for 1 year (0.3 ttl pk-yrs)     Types: Cigarettes    Smokeless tobacco: Never    Tobacco comments:     caffeine use   Vaping Use    Vaping status: Never Used   Substance and Sexual Activity    Alcohol use: No    Drug use: No    Sexual activity: Defer       Family History:  Family History   Problem Relation Age of Onset    Hypertension Mother     Atrial fibrillation Mother     Hypertension Father     Diabetes Maternal Grandmother     Diabetes Maternal Grandfather     Diabetes Paternal Grandmother     Diabetes Paternal Grandfather         Review of Systems  See history of present illness and past medical history.  Patient denies headache, dizziness, syncope, falls, trauma, change in vision, change in hearing, change in taste, focal weakness, numbness, or paresthesia.  Patient denies chest pain, palpitations, dyspnea, orthopnea, PND, cough, sinus pressure, rhinorrhea, epistaxis, hemoptysis, hematemesis,  constipation or hematochezia.  Denies cold or heat intolerance,  "polydipsia, polyuria, polyphagia. Denies hematuria, pyuria, dysuria, hesitancy, frequency or urgency. Denies consumption of raw and under cooked meats foods or change in water source.       Objective:  T Max 24 hrs: Temp (24hrs), Av.9 °F (37.2 °C), Min:98.2 °F (36.8 °C), Max:100 °F (37.8 °C)    Vitals Ranges:   Temp:  [98.2 °F (36.8 °C)-100 °F (37.8 °C)] 99 °F (37.2 °C)  Heart Rate:  [] 95  Resp:  [14-20] 20  BP: (106-156)/(54-86) 106/54      Exam:  /54 (BP Location: Right arm, Patient Position: Lying)   Pulse 95   Temp 99 °F (37.2 °C) (Oral)   Resp 20   Ht 162.6 cm (64\")   Wt 115 kg (253 lb 11.2 oz)   SpO2 95%   BMI 43.55 kg/m²     General Appearance:    Alert, cooperative, no distress, appears stated age   Head:    Normocephalic, without obvious abnormality, atraumatic   Eyes:    PERRL, conjunctivae/corneas clear, EOM's intact, both eyes   Ears:    Normal external ear canals, both ears   Nose:   Nares normal, septum midline, mucosa normal, no drainage    or sinus tenderness   Throat:   Lips, mucosa, and tongue normal   Neck:   Supple, symmetrical, trachea midline, no adenopathy;     thyroid:  no enlargement/tenderness/nodules; no carotid    bruit or JVD   Back:     Symmetric, no curvature, ROM normal, no CVA tenderness   Lungs:     Clear to auscultation bilaterally, respirations unlabored   Chest Wall:    No tenderness or deformity    Heart:    Regular rate and rhythm, S1 and S2 normal, no murmur, rub   or gallop   Abdomen:     Soft, nontender, bowel sounds active all four quadrants,     no masses, no hepatomegaly, no splenomegaly   Extremities:   Extremities normal, atraumatic, no cyanosis or edema                       .    Data Review:  Labs in chart were reviewed.  WBC   Date Value Ref Range Status   2025 10.85 (H) 3.40 - 10.80 10*3/mm3 Final     Hemoglobin   Date Value Ref Range Status   2025 14.5 12.0 - 15.9 g/dL Final     Hematocrit   Date Value Ref Range Status "   06/06/2025 45.8 34.0 - 46.6 % Final     Platelets   Date Value Ref Range Status   06/06/2025 249 140 - 450 10*3/mm3 Final     Sodium   Date Value Ref Range Status   06/06/2025 140 136 - 145 mmol/L Final     Potassium   Date Value Ref Range Status   06/06/2025 4.3 3.5 - 5.2 mmol/L Final     Chloride   Date Value Ref Range Status   06/06/2025 98 98 - 107 mmol/L Final     CO2   Date Value Ref Range Status   06/06/2025 26.4 22.0 - 29.0 mmol/L Final     BUN   Date Value Ref Range Status   06/06/2025 15.0 8.0 - 23.0 mg/dL Final     Creatinine   Date Value Ref Range Status   06/06/2025 1.47 (H) 0.57 - 1.00 mg/dL Final     Glucose   Date Value Ref Range Status   06/06/2025 93 65 - 99 mg/dL Final     Calcium   Date Value Ref Range Status   06/06/2025 10.0 8.6 - 10.5 mg/dL Final     AST (SGOT)   Date Value Ref Range Status   06/06/2025 44 (H) 1 - 32 U/L Final     ALT (SGPT)   Date Value Ref Range Status   06/06/2025 33 1 - 33 U/L Final     Alkaline Phosphatase   Date Value Ref Range Status   06/06/2025 133 (H) 39 - 117 U/L Final                Imaging Results (All)       Procedure Component Value Units Date/Time    CT Abdomen Pelvis With Contrast [[id number]] Collected: 06/06/25 1524     Updated: 06/06/25 1539    Narrative:      CT ABDOMEN PELVIS W CONTRAST-     INDICATION: Upper abdominal pain, with nausea and diarrhea     COMPARISON: CT abdomen pelvis April 10, 2023     TECHNIQUE:  Routine CT abdomen and pelvis with IV contrast. Coronal and sagittal  reformats. Radiation dose reduction techniques were utilized, including  automated exposure control and exposure modulation based on body size.     FINDINGS:      Lung bases: Subsegmental atelectasis at the bases.     ABDOMEN: Normal liver. Cholecystectomy. No biliary ductal dilatation.  Spleen is normal in size. Mild to moderate pancreatic atrophy. No  pancreatic ductal dilatation or mass seen. No adrenal nodules. No  solid-appearing renal mass or hydronephrosis.     Pelvis:  No bladder calculus. Hysterectomy. No adnexal mass. Surgical  clips in the pelvis.     Bowel: Small lipoma at the duodenal jejunal flexure, series 2, axial  mage 50, measures 1.5 cm. No small bowel obstruction. Rectum is  collapsed. Gas and fluid in the colonic lumen. Colon is under distended.  Suspect a small amount of colonic wall thickening. Normal appendix.  Abdominal wall: Rectus diastases. Periumbilical and pelvic wall  scarring.     Retroperitoneum: No retroperitoneal lymphadenopathy. Periportal  lymphadenopathy. For example, periportal lymph node, series 2, axial  mage 40, measures 1.1 cm, unchanged. For example, a portal caval lymph  node, series 2, axial mage 45, measures 1.3 cm, unchanged.     Vasculature: Patent. No abdominal aortic aneurysm. Mild aortic  atherosclerotic calcification.     Osseous structures: Bilateral femoral head osteophyte lipping.  Exaggerated lower lumbar lordosis. Bilateral L5 pars defects with slight  anterolisthesis of L5 on S1. Lower lumbar facet degenerative  arthropathy.       Impression:         1. Small amount of colonic wall thickening with gas and fluid in the  colonic lumen. Suspect colitis and diarrhea.  2. Mild periportal adenopathy is similar to prior and therefore most  likely reactive. Finding can be correlated with liver function tests.     This report was finalized on 6/6/2025 3:36 PM by Dr. Leon Delacruz M.D  on Workstation: [id number]                 Assessment:  Active Hospital Problems    Diagnosis  POA    **Colitis [K52.9]  Yes      Resolved Hospital Problems   No resolved problems to display.   Diarrhea  Crohns disease  Cad  Hypertension  Hyperlipidemia  Sleep apnea  [place]  Ckd3  obesity    Plan:  Will ask gi to see her  Fluids  Awaiting stool studies  Trend labs  Monitor on telemetry  Dw patient, ed provider  Patient is full code and spouse is kaylin Braswell Yvon Oviedo MD  6/6/2025  20:30 EDT

## 2025-06-07 NOTE — PLAN OF CARE
Goal Outcome Evaluation:  Plan of Care Reviewed With: patient           Outcome Evaluation: No stool overnight.

## 2025-06-07 NOTE — PROGRESS NOTES
Name: Rae Hyde ADMIT: 2025   : 1960  PCP: Rolando Elena MD    MRN: [id number] LOS: 0 days   AGE/SEX: 64 y.o. female  ROOM: [facility]     Subjective   Subjective   No acute events overnight.  Patient states she is feeling better than when she came in.  She did just have a bowel movement so that stool will be sent for GI PCR and C. difficile.  She denies chest pain or shortness of breath.  Still having some abdominal pain.  Did not eat much of her CLT breakfast.    Objective   Objective     Vital Signs  Temp:  [98.2 °F (36.8 °C)-100 °F (37.8 °C)] 98.2 °F (36.8 °C)  Heart Rate:  [] 85  Resp:  [14-20] 18  BP: ()/(54-86) 120/54  SpO2:  [92 %-96 %] 95 %  on  Flow (L/min) (Oxygen Therapy):  [2] 2;   Device (Oxygen Therapy): nasal cannula  Body mass index is 43.67 kg/m².    Physical Exam  General: Alert, no acute distress.  Lying in bed.  Chronically ill-appearing.  ENT: No conjunctival injection or scleral icterus. Moist mucous membranes.  Neuro: Eyes open and moving in all directions, generalized weakness, face symmetric, no focal deficits.   Lungs: Clear to auscultation bilaterally. No wheeze or crackles. No distress.   Heart: RRR, no murmurs. No edema.  Abdomen: Soft, nondistended, normal bowel sounds.  Mild tenderness to palpation diffusely, no rebound or guarding.  Ext: Warm and well-perfused. No edema.   Skin: No rashes or lesions. IV site without swelling or erythema.     Results Review     I reviewed the patient's new clinical results:  Results from last 7 days   Lab Units 25  0530 25  1322   WBC 10*3/mm3 9.81 10.85*   HEMOGLOBIN g/dL 11.0* 14.5   PLATELETS 10*3/mm3 195 249     Results from last 7 days   Lab Units 25  0530 25  1322   SODIUM mmol/L 138 140   POTASSIUM mmol/L 3.6 4.3   CHLORIDE mmol/L 105 98   CO2 mmol/L 23.1 26.4   BUN mg/dL 15.0 15.0   CREATININE mg/dL 1.24* 1.47*   GLUCOSE mg/dL 101* 93   EGFR mL/min/1.73 48.4* 39.5*     Results  "from last 7 days   Lab Units 06/06/25  1322   ALBUMIN g/dL 4.2   BILIRUBIN mg/dL 0.9   ALK PHOS U/L 133*   AST (SGOT) U/L 44*   ALT (SGPT) U/L 33     Results from last 7 days   Lab Units 06/07/25  0530 06/06/25  1322   CALCIUM mg/dL 8.2* 10.0   ALBUMIN g/dL  --  4.2     Results from last 7 days   Lab Units 06/06/25  1614 06/06/25  1322   LACTATE mmol/L 1.0 3.4*     No results found for: \"HGBA1C\", \"POCGLU\"    CT Abdomen Pelvis With Contrast  Result Date: 6/6/2025   1. Small amount of colonic wall thickening with gas and fluid in the colonic lumen. Suspect colitis and diarrhea. 2. Mild periportal adenopathy is similar to prior and therefore most likely reactive. Finding can be correlated with liver function tests.  This report was finalized on 6/6/2025 3:36 PM by Dr. Leon Delacruz M.D on Workstation: [id number]        I have personally reviewed all medications:  Scheduled Medications  amitriptyline, 25 mg, Oral, Nightly  busPIRone, 15 mg, Oral, 4x Daily  DULoxetine, 60 mg, Oral, Daily  gabapentin, 400 mg, Oral, 4x Daily  metoprolol succinate XL, 25 mg, Oral, Nightly    Infusions  sodium chloride, 75 mL/hr, Last Rate: 75 mL/hr (06/07/25 0705)  sodium chloride, 75 mL/hr  sodium chloride, 75 mL/hr    Diet  Diet: Liquid; Clear Liquid; Fluid Consistency: Thin (IDDSI 0)      Intake/Output Summary (Last 24 hours) at 6/7/2025 1120  Last data filed at 6/7/2025 0330  Gross per 24 hour   Intake 2730 ml   Output --   Net 2730 ml       Assessment/Plan     Active Hospital Problems    Diagnosis  POA    **Colitis [K52.9]  Yes      Resolved Hospital Problems   No resolved problems to display.       64 y.o. female with Colitis.    Colitis  Crohn's disease  -CT abdomen/pelvis with small amount of colonic wall thickening with gas and fluid in the colonic lumen  -Leukocytosis on admission, WBC is now normalized.  Lactic acid normalized as well.  -GI PCR and C. difficile negative  -Blood culture pending, no growth to date  - GI " consulted, evaluation pending  - Clear liquid diet today, possible advancement tomorrow  - With negative GI PCR and improved symptoms/no blood in stool, suspect this is most likely a viral gastroenteritis.  Will await GI evaluation to see if they would like to perform any other workup.  Patient still having some abdominal tenderness and not tolerating clear liquid diet.  Would like to keep her here until she is eating well.  Possible discharge home tomorrow if improved and consulting services agree.    CKD  - Creatinine fairly consistent with baseline this morning at 1.24  - Continue IV fluids while p.o. intake is poor  - Avoid nephrotoxic agents including NSAIDs and contrast dyes  - Repeat BMP with morning labs    Hypertension  -BP trend acceptable  - Continue home metoprolol    Elevated transaminase  - ALT elevated on admission, has been high in the past  - Repeat CMP with morning labs  -Further workup if LFTs continue to increase    Mood disorder  -Continue home meds    JULIANA  - Wears CPAP at home, okay for hospital CPAP if available  - Oxygen supplementation as needed    SCDs for DVT prophylaxis.  Full code.  Discussed with patient.  Anticipate discharge home in 1-2 days.      Odessa Roman MD  [facility]  06/07/25  11:20 EDT

## 2025-06-07 NOTE — CONSULTS
[facility]  Initial Inpatient Consult Note    Referring Provider: Dr. Roman    Reason for Consultation: Abdominal pain, hx of Crohns    Subjective     History of present illness:    64 y.o. female history of Crohn's obstructive sleep apnea.  Presents with nausea vomiting and abdominal pain.  Patient is reports she used to follow with Dr. Orellana, she has not been on biologic in years.  Reports her last colonoscopy year ago was normal no signs of active colitis.  Reports his abdominal pain is acute having nausea vomiting and diarrhea.  No blood in stool.  She feels about the same as when she presented.  Pain is not worse.    Past Medical History:  Past Medical History:   Diagnosis Date    Arthritis     Asthma     CAD (coronary artery disease)     Chest pain     Crohn's disease     Depression     Hyperlipidemia     Hypertension     Migraine     Precordial pain     Sleep apnea     Snoring      Past Surgical History:  Past Surgical History:   Procedure Laterality Date    ABDOMINAL SURGERY      CARDIAC CATHETERIZATION      CARDIAC CATHETERIZATION Left 6/13/2018    Procedure: Cardiac Catheterization/Vascular Study;  Surgeon: Sukh Fisher MD;  Location: [facility] CATH INVASIVE LOCATION;  Service: Cardiovascular    CARDIAC CATHETERIZATION N/A 6/13/2018    Procedure: Left Heart Cath;  Surgeon: Sukh Fisher MD;  Location[facility]VAS[facility];  Service: Cardiovascular    CARDIAC CATHETERIZATION N/A 6/13/2018    Procedure: Coronary angiography;  Surgeon: Sukh Fisher MD;  [facility];  Service: Cardiovascular    CARDIAC CATHETERIZATION N/A 6/13/2018    Procedure: Left ventriculography;  Surgeon: Sukh Fisher MD;  [facility];  Service: Cardiovascular    CHOLECYSTECTOMY      HERNIA REPAIR      HYSTERECTOMY      JOINT REPLACEMENT      KNEE SURGERY        Social History:   Social History     Tobacco Use     Smoking status: Former     Current packs/day: 0.25     Average packs/day: 0.3 packs/day for 1 year (0.3 ttl pk-yrs)     Types: Cigarettes    Smokeless tobacco: Never    Tobacco comments:     caffeine use   Substance Use Topics    Alcohol use: No      Family History:  Family History   Problem Relation Age of Onset    Hypertension Mother     Atrial fibrillation Mother     Hypertension Father     Diabetes Maternal Grandmother     Diabetes Maternal Grandfather     Diabetes Paternal Grandmother     Diabetes Paternal Grandfather        Home Meds:  Facility-Administered Medications Prior to Admission   Medication Dose Route Frequency Provider Last Rate Last Admin    nitroglycerin (NITROSTAT) SL tablet 0.4 mg  0.4 mg Sublingual Q5 Min PRN Sy Antonio III, MD   0.4 mg at 06/12/18 1517     Medications Prior to Admission   Medication Sig Dispense Refill Last Dose/Taking    amitriptyline (ELAVIL) 25 MG tablet Take 1 tablet by mouth Every Night.   6/5/2025 Evening    busPIRone (BUSPAR) 15 MG tablet Take 1 tablet by mouth 4 (Four) Times a Day.   6/6/2025 Morning    butalbital-acetaminophen-caffeine (FIORICET, ESGIC) -40 MG per tablet Take 2 tablets by mouth Every 6 (Six) Hours As Needed for Headache or Migraine. 10 tablet 0 Past Week    cyanocobalamin 1000 MCG/ML injection    5/15/2025    DULoxetine (CYMBALTA) 60 MG capsule Take 1 capsule by mouth Daily.   6/5/2025 Evening    ezetimibe (ZETIA) 10 MG tablet Take 1 tablet by mouth Daily.   6/5/2025 Evening    gabapentin (NEURONTIN) 400 MG capsule Take 1 capsule by mouth 4 (Four) Times a Day.   6/6/2025 Morning    HYDROcodone-acetaminophen (NORCO) 7.5-325 MG per tablet Take 1 tablet by mouth Every 6 (Six) Hours As Needed.   6/5/2025 Bedtime    hydrOXYzine (ATARAX) 25 MG tablet TAKE ONE TABLET BY MOUTH AT NIGHT IF NEEDED FOR ITCHING   Past Week Bedtime    meclizine (ANTIVERT) 25 MG tablet Take 1 tablet by mouth 3 (Three) Times a Day As Needed for Dizziness or Nausea. 20  tablet 0 Taking As Needed    metoprolol succinate XL (TOPROL-XL) 25 MG 24 hr tablet TAKE 1 TABLET DAILY 90 tablet 0 6/5/2025 Evening    promethazine (PHENERGAN) 25 MG tablet Take 1 tablet by mouth Every 6 (Six) Hours As Needed.   6/6/2025 Morning    ubrogepant (UBRELVY) 100 MG tablet Take 1 tablet by mouth Daily As Needed.   Past Week Morning    albuterol sulfate  (90 Base) MCG/ACT inhaler Inhale 2 puffs.       aspirin 81 MG tablet Take 1 tablet by mouth Daily. (Patient not taking: Reported on 9/13/2024)       hyoscyamine sulfate (ANASPAZ) 0.125 MG tablet dispersible disintegrating tablet Place 1 tablet on the tongue 3 (Three) Times a Day As Needed.       Qulipta 60 MG tablet Take 1 tablet by mouth Daily.       SUMAtriptan (IMITREX) 50 MG tablet Take one tablet at onset of headache. May repeat dose one time in 2 hours if headache not relieved. (Patient not taking: Reported on 9/13/2024) 10 tablet 1      Current Meds:   amitriptyline, 25 mg, Oral, Nightly  busPIRone, 15 mg, Oral, 4x Daily  DULoxetine, 60 mg, Oral, Daily  gabapentin, 400 mg, Oral, 4x Daily  metoprolol succinate XL, 25 mg, Oral, Nightly      Allergies:  Allergies   Allergen Reactions    Latex Rash and Hives    Peg 3350-Kcl-Na Bicarb-Nacl Rash and Hives     Possibly container?    Pravastatin Nausea Only    Simvastatin Nausea Only     Review of Systems  Pertinent items are noted in HPI     Objective     Vital Signs  Temp:  [98.2 °F (36.8 °C)-100 °F (37.8 °C)] 98.8 °F (37.1 °C)  Heart Rate:  [] 87  Resp:  [14-20] 16  BP: ()/(54-86) 132/58  Physical Exam:  General Appearance:    Alert, cooperative, in no acute distress   Head:    Normocephalic, without obvious abnormality, atraumatic   Eyes:          Conjunctivae and sclerae normal, no icterus   Throat:   No thrush, oral mucosa moist   Neck:   Supple, no adenopathy   Lungs:     Clear to auscultation bilaterally    Heart:    Regular rhythm and normal rate    Chest Wall:    No  abnormalities observed   Abdomen:     Soft, nondistended, nontender; normal bowel sounds   Extremities:   No edema, no redness   Skin:   No bruising or rash   Psychiatric:   Normal mood and insight     Results Review:   I reviewed the patient's new clinical results.    Results from last 7 days   Lab Units 06/07/25  0530 06/06/25  1322   WBC 10*3/mm3 9.81 10.85*   HEMOGLOBIN g/dL 11.0* 14.5   HEMATOCRIT % 33.4* 45.8   PLATELETS 10*3/mm3 195 249     Results from last 7 days   Lab Units 06/07/25  0530 06/06/25  1322   SODIUM mmol/L 138 140   POTASSIUM mmol/L 3.6 4.3   CHLORIDE mmol/L 105 98   CO2 mmol/L 23.1 26.4   BUN mg/dL 15.0 15.0   CREATININE mg/dL 1.24* 1.47*   CALCIUM mg/dL 8.2* 10.0   BILIRUBIN mg/dL  --  0.9   ALK PHOS U/L  --  133*   ALT (SGPT) U/L  --  33   AST (SGOT) U/L  --  44*   GLUCOSE mg/dL 101* 93         Lab Results   Lab Value Date/Time    LIPASE 29 06/06/2025 1322    LIPASE 19 04/10/2023 1754    LIPASE 125 (H) 07/10/2015 1746       Radiology:  CT Abdomen Pelvis With Contrast   Final Result       1. Small amount of colonic wall thickening with gas and fluid in the   colonic lumen. Suspect colitis and diarrhea.   2. Mild periportal adenopathy is similar to prior and therefore most   likely reactive. Finding can be correlated with liver function tests.       This report was finalized on 6/6/2025 3:36 PM by Dr. Leon Delacruz M.D   on Workstation: [id number]              Assessment & Plan   Active Hospital Problems    Diagnosis     **Colitis        Assessment:  N/V/Abdominal pain/Diarrhea  -Hx of Crohns   -Check stool studies for viral GI pathogen   -Check stool culture    2. Hx of Crohns   -Check ESR   -CT with non specific colitis   - Not on biologic.  Had a colonoscopy that she reports was normal and is currently in remission.  When she had the colonoscopy she was on a biologic she reports.      Plan: Stool studies.  Check ESR.  Unclear if this is actually a Crohn's flare.  Stool studies  negative.  Would see how she feels tomorrow food poisoning is on the differential as well.  Do not feel strongly that she needs repeat endoscopy at this time.  Fecal calprotectin pending      I discussed the patient's findings and my recommendations with patient.    Jesse Gonzales MD

## 2025-06-08 LAB
ALBUMIN SERPL-MCNC: 3.3 G/DL (ref 3.5–5.2)
ALBUMIN/GLOB SERPL: 1 G/DL
ALP SERPL-CCNC: 93 U/L (ref 39–117)
ALT SERPL W P-5'-P-CCNC: 23 U/L (ref 1–33)
ANION GAP SERPL CALCULATED.3IONS-SCNC: 11.7 MMOL/L (ref 5–15)
AST SERPL-CCNC: 34 U/L (ref 1–32)
BILIRUB SERPL-MCNC: 0.4 MG/DL (ref 0–1.2)
BUN SERPL-MCNC: 11 MG/DL (ref 8–23)
BUN/CREAT SERPL: 8.5 (ref 7–25)
CALCIUM SPEC-SCNC: 8.3 MG/DL (ref 8.6–10.5)
CHLORIDE SERPL-SCNC: 105 MMOL/L (ref 98–107)
CO2 SERPL-SCNC: 20.3 MMOL/L (ref 22–29)
CREAT SERPL-MCNC: 1.29 MG/DL (ref 0.57–1)
DEPRECATED RDW RBC AUTO: 44.3 FL (ref 37–54)
EGFRCR SERPLBLD CKD-EPI 2021: 46.2 ML/MIN/1.73
ERYTHROCYTE [DISTWIDTH] IN BLOOD BY AUTOMATED COUNT: 14.2 % (ref 12.3–15.4)
GLOBULIN UR ELPH-MCNC: 3.2 GM/DL
GLUCOSE SERPL-MCNC: 77 MG/DL (ref 65–99)
HCT VFR BLD AUTO: 33.8 % (ref 34–46.6)
HGB BLD-MCNC: 11.1 G/DL (ref 12–15.9)
MCH RBC QN AUTO: 28.1 PG (ref 26.6–33)
MCHC RBC AUTO-ENTMCNC: 32.8 G/DL (ref 31.5–35.7)
MCV RBC AUTO: 85.6 FL (ref 79–97)
PLATELET # BLD AUTO: 127 10*3/MM3 (ref 140–450)
PMV BLD AUTO: 10.7 FL (ref 6–12)
POTASSIUM SERPL-SCNC: 4.7 MMOL/L (ref 3.5–5.2)
PROT SERPL-MCNC: 6.5 G/DL (ref 6–8.5)
QT INTERVAL: 424 MS
QTC INTERVAL: 462 MS
RBC # BLD AUTO: 3.95 10*6/MM3 (ref 3.77–5.28)
SODIUM SERPL-SCNC: 137 MMOL/L (ref 136–145)
WBC NRBC COR # BLD AUTO: 7.73 10*3/MM3 (ref 3.4–10.8)

## 2025-06-08 PROCEDURE — 93010 ELECTROCARDIOGRAM REPORT: CPT | Performed by: STUDENT IN AN ORGANIZED HEALTH CARE EDUCATION/TRAINING PROGRAM

## 2025-06-08 PROCEDURE — 25010000002 ONDANSETRON PER 1 MG: Performed by: INTERNAL MEDICINE

## 2025-06-08 PROCEDURE — 63710000001 PROMETHAZINE PER 12.5 MG: Performed by: STUDENT IN AN ORGANIZED HEALTH CARE EDUCATION/TRAINING PROGRAM

## 2025-06-08 PROCEDURE — 85027 COMPLETE CBC AUTOMATED: CPT | Performed by: STUDENT IN AN ORGANIZED HEALTH CARE EDUCATION/TRAINING PROGRAM

## 2025-06-08 PROCEDURE — 80053 COMPREHEN METABOLIC PANEL: CPT | Performed by: STUDENT IN AN ORGANIZED HEALTH CARE EDUCATION/TRAINING PROGRAM

## 2025-06-08 PROCEDURE — 25810000003 SODIUM CHLORIDE 0.9 % SOLUTION: Performed by: STUDENT IN AN ORGANIZED HEALTH CARE EDUCATION/TRAINING PROGRAM

## 2025-06-08 PROCEDURE — 93005 ELECTROCARDIOGRAM TRACING: CPT | Performed by: STUDENT IN AN ORGANIZED HEALTH CARE EDUCATION/TRAINING PROGRAM

## 2025-06-08 PROCEDURE — 99232 SBSQ HOSP IP/OBS MODERATE 35: CPT | Performed by: INTERNAL MEDICINE

## 2025-06-08 PROCEDURE — 25010000002 DIPHENHYDRAMINE PER 50 MG: Performed by: STUDENT IN AN ORGANIZED HEALTH CARE EDUCATION/TRAINING PROGRAM

## 2025-06-08 PROCEDURE — 63710000001 ONDANSETRON ODT 4 MG TABLET DISPERSIBLE: Performed by: INTERNAL MEDICINE

## 2025-06-08 PROCEDURE — 25010000002 PROCHLORPERAZINE 10 MG/2ML SOLUTION: Performed by: STUDENT IN AN ORGANIZED HEALTH CARE EDUCATION/TRAINING PROGRAM

## 2025-06-08 RX ORDER — PROCHLORPERAZINE EDISYLATE 5 MG/ML
10 INJECTION INTRAMUSCULAR; INTRAVENOUS ONCE
Status: COMPLETED | OUTPATIENT
Start: 2025-06-08 | End: 2025-06-08

## 2025-06-08 RX ORDER — SCOPOLAMINE 1 MG/3D
1 PATCH, EXTENDED RELEASE TRANSDERMAL
Status: DISCONTINUED | OUTPATIENT
Start: 2025-06-08 | End: 2025-06-10

## 2025-06-08 RX ORDER — DIPHENHYDRAMINE HYDROCHLORIDE 50 MG/ML
25 INJECTION, SOLUTION INTRAMUSCULAR; INTRAVENOUS ONCE
Status: COMPLETED | OUTPATIENT
Start: 2025-06-08 | End: 2025-06-08

## 2025-06-08 RX ADMIN — GABAPENTIN 400 MG: 400 CAPSULE ORAL at 17:33

## 2025-06-08 RX ADMIN — BUSPIRONE HYDROCHLORIDE 15 MG: 15 TABLET ORAL at 20:06

## 2025-06-08 RX ADMIN — GABAPENTIN 400 MG: 400 CAPSULE ORAL at 08:13

## 2025-06-08 RX ADMIN — GABAPENTIN 400 MG: 400 CAPSULE ORAL at 12:23

## 2025-06-08 RX ADMIN — SODIUM CHLORIDE 75 ML/HR: 9 INJECTION, SOLUTION INTRAVENOUS at 14:36

## 2025-06-08 RX ADMIN — ONDANSETRON 4 MG: 2 INJECTION, SOLUTION INTRAMUSCULAR; INTRAVENOUS at 00:07

## 2025-06-08 RX ADMIN — AMITRIPTYLINE HYDROCHLORIDE 25 MG: 50 TABLET, FILM COATED ORAL at 20:06

## 2025-06-08 RX ADMIN — BUSPIRONE HYDROCHLORIDE 15 MG: 15 TABLET ORAL at 08:13

## 2025-06-08 RX ADMIN — PROMETHAZINE HYDROCHLORIDE 12.5 MG: 12.5 TABLET ORAL at 04:23

## 2025-06-08 RX ADMIN — SCOPOLAMINE 1 PATCH: 1.5 PATCH, EXTENDED RELEASE TRANSDERMAL at 08:13

## 2025-06-08 RX ADMIN — BUSPIRONE HYDROCHLORIDE 15 MG: 15 TABLET ORAL at 17:33

## 2025-06-08 RX ADMIN — HYDROCODONE BITARTRATE AND ACETAMINOPHEN 1 TABLET: 7.5; 325 TABLET ORAL at 00:13

## 2025-06-08 RX ADMIN — BUSPIRONE HYDROCHLORIDE 15 MG: 15 TABLET ORAL at 12:23

## 2025-06-08 RX ADMIN — SODIUM CHLORIDE 75 ML/HR: 9 INJECTION, SOLUTION INTRAVENOUS at 02:20

## 2025-06-08 RX ADMIN — HYDROCODONE BITARTRATE AND ACETAMINOPHEN 1 TABLET: 7.5; 325 TABLET ORAL at 08:13

## 2025-06-08 RX ADMIN — ONDANSETRON 4 MG: 2 INJECTION, SOLUTION INTRAMUSCULAR; INTRAVENOUS at 14:39

## 2025-06-08 RX ADMIN — ACETAMINOPHEN 650 MG: 325 TABLET ORAL at 13:30

## 2025-06-08 RX ADMIN — HYDROCODONE BITARTRATE AND ACETAMINOPHEN 1 TABLET: 7.5; 325 TABLET ORAL at 14:39

## 2025-06-08 RX ADMIN — PROCHLORPERAZINE EDISYLATE 10 MG: 5 INJECTION INTRAMUSCULAR; INTRAVENOUS at 11:18

## 2025-06-08 RX ADMIN — GABAPENTIN 400 MG: 400 CAPSULE ORAL at 20:07

## 2025-06-08 RX ADMIN — DULOXETINE 60 MG: 60 CAPSULE, DELAYED RELEASE ORAL at 20:10

## 2025-06-08 RX ADMIN — METOPROLOL SUCCINATE 25 MG: 25 TABLET, EXTENDED RELEASE ORAL at 20:07

## 2025-06-08 RX ADMIN — DIPHENHYDRAMINE HYDROCHLORIDE 25 MG: 50 INJECTION, SOLUTION INTRAMUSCULAR; INTRAVENOUS at 11:09

## 2025-06-08 RX ADMIN — ONDANSETRON 4 MG: 4 TABLET, ORALLY DISINTEGRATING ORAL at 20:45

## 2025-06-08 RX ADMIN — HYDROCODONE BITARTRATE AND ACETAMINOPHEN 1 TABLET: 7.5; 325 TABLET ORAL at 20:50

## 2025-06-08 RX ADMIN — ONDANSETRON 4 MG: 4 TABLET, ORALLY DISINTEGRATING ORAL at 06:12

## 2025-06-08 NOTE — PROGRESS NOTES
[facility]  Inpatient Progress Note    Reason for Follow Up: Abdominal pain    Subjective     Interval History:   Patient reports feeling a lot better today.  No new complaints.    Current Facility-Administered Medications:     acetaminophen (TYLENOL) tablet 650 mg, 650 mg, Oral, Q4H PRN, 650 mg at 06/06/25 1850 **OR** acetaminophen (TYLENOL) 160 MG/5ML oral solution 650 mg, 650 mg, Oral, Q4H PRN **OR** acetaminophen (TYLENOL) suppository 650 mg, 650 mg, Rectal, Q4H PRN, Michaelle Oviedo MD    albuterol (PROVENTIL) nebulizer solution 0.083% 2.5 mg/3mL, 2.5 mg, Nebulization, Q6H PRN, Michaelle Oviedo MD    amitriptyline (ELAVIL) tablet 25 mg, 25 mg, Oral, Nightly, Michaelle Oviedo MD, 25 mg at 06/07/25 2141    busPIRone (BUSPAR) tablet 15 mg, 15 mg, Oral, 4x Daily, Michaelle Oviedo MD, 15 mg at 06/07/25 2144    DULoxetine (CYMBALTA) DR capsule 60 mg, 60 mg, Oral, Daily, Michaelle Oviedo MD, 60 mg at 06/07/25 2144    gabapentin (NEURONTIN) capsule 400 mg, 400 mg, Oral, 4x Daily, Michaelle Oviedo MD, 400 mg at 06/07/25 2144    HYDROcodone-acetaminophen (NORCO) 7.5-325 MG per tablet 1 tablet, 1 tablet, Oral, Q6H PRN, Michaelle Oviedo MD, 1 tablet at 06/08/25 0013    hyoscyamine (LEVSIN) SL tablet 125 mcg, 125 mcg, Sublingual, Q6H PRN, Michaelle Oviedo MD    melatonin tablet 2.5 mg, 2.5 mg, Oral, Nightly PRN, Michaelle Oviedo MD    metoprolol succinate XL (TOPROL-XL) 24 hr tablet 25 mg, 25 mg, Oral, Nightly, Michaelle Oviedo MD, 25 mg at 06/07/25 2144    ondansetron ODT (ZOFRAN-ODT) disintegrating tablet 4 mg, 4 mg, Oral, Q6H PRN, 4 mg at 06/08/25 0612 **OR** ondansetron (ZOFRAN) injection 4 mg, 4 mg, Intravenous, Q6H PRN, Stingl, Michaelle Sanz MD, 4 mg at 06/08/25 0007    promethazine (PHENERGAN) tablet 12.5 mg, 12.5 mg, Oral, Q6H PRN, 12.5 mg at 06/08/25 0423 **OR** promethazine (PHENERGAN) suppository 12.5 mg, 12.5 mg, Rectal, Q6H  DAMIEN, Odessa Roman MD    scopolamine patch 1 mg/72 hr, 1 patch, Transdermal, Q72H, Jesse Gonzales MD    sodium chloride 0.9 % flush 10 mL, 10 mL, Intravenous, Ramírez QUEZADA April Perry, MD    sodium chloride 0.9 % infusion, 75 mL/hr, Intravenous, Continuous, Odessa Roman MD, Last Rate: 75 mL/hr at 06/08/25 0220, 75 mL/hr at 06/08/25 0220  Review of Systems:    The following systems were reviewed and negative;  gastrointestinal    Objective     Vital Signs  Temp:  [98.2 °F (36.8 °C)-99.1 °F (37.3 °C)] 98.7 °F (37.1 °C)  Heart Rate:  [75-85] 78  Resp:  [17-18] 18  BP: (116-130)/(51-61) 130/58  Body mass index is 43.67 kg/m².    Intake/Output Summary (Last 24 hours) at 6/8/2025 0638  Last data filed at 6/7/2025 1613  Gross per 24 hour   Intake 720 ml   Output --   Net 720 ml     No intake/output data recorded.     Physical Exam:   General: patient awake, alert and cooperative   Eyes: Normal lids and lashes, no scleral icterus   Neck: supple, normal ROM   Skin: warm and dry, not jaundiced   Cardiovascular: regular rhythm and rate, no murmurs auscultated   Pulm: clear to auscultation bilaterally, regular and unlabored   Abdomen: soft, nontender, nondistended; normal bowel sounds   Extremities: no rash or edema   Psychiatric: Normal mood and behavior; memory intact     Results Review:     I reviewed the patient's new clinical results.    Results from last 7 days   Lab Units 06/07/25  0530 06/06/25  1322   WBC 10*3/mm3 9.81 10.85*   HEMOGLOBIN g/dL 11.0* 14.5   HEMATOCRIT % 33.4* 45.8   PLATELETS 10*3/mm3 195 249     Results from last 7 days   Lab Units 06/07/25  0530 06/06/25  1322   SODIUM mmol/L 138 140   POTASSIUM mmol/L 3.6 4.3   CHLORIDE mmol/L 105 98   CO2 mmol/L 23.1 26.4   BUN mg/dL 15.0 15.0   CREATININE mg/dL 1.24* 1.47*   CALCIUM mg/dL 8.2* 10.0   BILIRUBIN mg/dL  --  0.9   ALK PHOS U/L  --  133*   ALT (SGPT) U/L  --  33   AST (SGOT) U/L  --  44*   GLUCOSE mg/dL 101* 93         Lab Results   Lab Value  Date/Time    LIPASE 29 06/06/2025 1322    LIPASE 19 04/10/2023 1754    LIPASE 125 (H) 07/10/2015 1746       Radiology:  CT Abdomen Pelvis With Contrast   Final Result       1. Small amount of colonic wall thickening with gas and fluid in the   colonic lumen. Suspect colitis and diarrhea.   2. Mild periportal adenopathy is similar to prior and therefore most   likely reactive. Finding can be correlated with liver function tests.       This report was finalized on 6/6/2025 3:36 PM by Dr. Leon Delacruz M.D   on Workstation: [id number]              Assessment & Plan     Active Hospital Problems    Diagnosis     **Colitis        Assessment:  N/V/Abdominal pain/Diarrhea  -Hx of Crohns              -Check stool studies for viral GI pathogen-negative              -Check stool culture-negative     2. Hx of Crohns              -Check ESR              -CT with non specific colitis              - Not on biologic.  Had a colonoscopy that she reports was normal and is currently in remission.  When she had the colonoscopy she was on a biologic she reports.     3. Nausea   -Add scop patch     Plan: Stool studies negative, pending fecal calprotectin and ESR. If these are consistent with crohns flare will start steroids. Other differential is food poisoning, and dyspepsia symptoms. If not improving in the next few days would start steroids.     - Patient is feeling better would hold off on steroids do not think this is a Crohn's flare.  Likely home tomorrow if still feeling better.  He appears to be eating and drinking without issues.        I discussed the patient's findings and my recommendations with patient.    Jesse Gonzales MD

## 2025-06-08 NOTE — PLAN OF CARE
Goal Outcome Evaluation:           Progress: no change  Outcome Evaluation: AxOx4, on O2 @ 2lpm at sleep, still needs stool sample, on continuous IV, PRN pain meds given and migraine cocktail.Needs attended, will continued to monitor.

## 2025-06-08 NOTE — PROGRESS NOTES
Name: Rae Hyde ADMIT: 2025   : 1960  PCP: Rolando Elena MD    MRN: [id number] LOS: 0 days   AGE/SEX: 64 y.o. female  ROOM: [facility]     Subjective   Subjective   No acute events overnight.  Patient had worsening of her nausea overnight.  She has not had any vomiting but has had pretty persistent nausea.  She denies chest pain or shortness of breath.  She has been afebrile.  She also states that she has a migraine this morning which she frequently gets at home.    Objective   Objective     Vital Signs  Temp:  [98.2 °F (36.8 °C)-99.5 °F (37.5 °C)] 99.5 °F (37.5 °C)  Heart Rate:  [75-78] 78  Resp:  [17-18] 18  BP: (122-130)/(51-60) 130/60  SpO2:  [94 %-100 %] 100 %  on  Flow (L/min) (Oxygen Therapy):  [2] 2;   Device (Oxygen Therapy): nasal cannula  Body mass index is 43.44 kg/m².    Physical Exam  General: Alert, no acute distress.  Lying in bed.  Chronically ill-appearing.  ENT: No conjunctival injection or scleral icterus. Moist mucous membranes.  Neuro: Eyes open and moving in all directions, generalized weakness, face symmetric, no focal deficits.   Lungs: Clear to auscultation bilaterally. No wheeze or crackles. No distress.   Heart: RRR, no murmurs. No edema.  Abdomen: Soft, normal bowel sounds.  Mildly distended, generalized tenderness to palpation.  Ext: Warm and well-perfused. No edema.   Skin: No rashes or lesions. IV site without swelling or erythema.     Results Review     I reviewed the patient's new clinical results:  Results from last 7 days   Lab Units 25  0731 25  0530 25  1322   WBC 10*3/mm3 7.73 9.81 10.85*   HEMOGLOBIN g/dL 11.1* 11.0* 14.5   PLATELETS 10*3/mm3 127* 195 249     Results from last 7 days   Lab Units 25  0731 25  0530 25  1322   SODIUM mmol/L 137 138 140   POTASSIUM mmol/L 4.7 3.6 4.3   CHLORIDE mmol/L 105 105 98   CO2 mmol/L 20.3* 23.1 26.4   BUN mg/dL 11.0 15.0 15.0   CREATININE mg/dL 1.29* 1.24* 1.47*   GLUCOSE  "mg/dL 77 101* 93   EGFR mL/min/1.73 46.2* 48.4* 39.5*     Results from last 7 days   Lab Units 06/08/25  0731 06/06/25  1322   ALBUMIN g/dL 3.3* 4.2   BILIRUBIN mg/dL 0.4 0.9   ALK PHOS U/L 93 133*   AST (SGOT) U/L 34* 44*   ALT (SGPT) U/L 23 33     Results from last 7 days   Lab Units 06/08/25  0731 06/07/25  0530 06/06/25  1322   CALCIUM mg/dL 8.3* 8.2* 10.0   ALBUMIN g/dL 3.3*  --  4.2     Results from last 7 days   Lab Units 06/06/25  1614 06/06/25  1322   LACTATE mmol/L 1.0 3.4*     No results found for: \"HGBA1C\", \"POCGLU\"    CT Abdomen Pelvis With Contrast  Result Date: 6/6/2025   1. Small amount of colonic wall thickening with gas and fluid in the colonic lumen. Suspect colitis and diarrhea. 2. Mild periportal adenopathy is similar to prior and therefore most likely reactive. Finding can be correlated with liver function tests.  This report was finalized on 6/6/2025 3:36 PM by Dr. Leon Delacruz M.D on Workstation: [id number]        I have personally reviewed all medications:  Scheduled Medications  amitriptyline, 25 mg, Oral, Nightly  busPIRone, 15 mg, Oral, 4x Daily  DULoxetine, 60 mg, Oral, Daily  gabapentin, 400 mg, Oral, 4x Daily  metoprolol succinate XL, 25 mg, Oral, Nightly  Scopolamine, 1 patch, Transdermal, Q72H    Infusions  sodium chloride, 75 mL/hr, Last Rate: 75 mL/hr (06/08/25 0943)    Diet  Diet: Liquid; Clear Liquid; Fluid Consistency: Thin (IDDSI 0)      Intake/Output Summary (Last 24 hours) at 6/8/2025 1128  Last data filed at 6/8/2025 0813  Gross per 24 hour   Intake 580 ml   Output --   Net 580 ml       Assessment/Plan     Active Hospital Problems    Diagnosis  POA    **Colitis [K52.9]  Yes    Nausea & vomiting [R11.2]  Yes    Hypertension [I10]  Yes    Crohn's disease [K50.90]  Yes      Resolved Hospital Problems   No resolved problems to display.     64 y.o. female with Colitis.    Colitis  Crohn's disease  Nausea  -CT abdomen/pelvis with small amount of colonic wall thickening with " gas and fluid in the colonic lumen  -Leukocytosis on admission, WBC is now normalized.  Lactic acid normalized as well.  -GI PCR and C. difficile negative  -Blood culture pending, no growth to date  - GI consulted and following  - Continue clear liquid diet  - Continues to have nausea as well as migraine this morning.  Will trial IV Compazine and Benadryl and continue fluids.  Hold off on Toradol portion of migraine cocktail given YECENIA.  - GI has added scopolamine patch    CKD  - Creatinine fairly consistent with baseline this morning at 1.29  - Continue IV fluids while p.o. intake is poor  - Avoid nephrotoxic agents including NSAIDs and contrast dyes  - Repeat BMP with morning labs    Hypertension  -BP trend acceptable  - Continue home metoprolol    Elevated transaminase  - ALT elevated on admission, improved this morning  - Repeat CMP with morning labs  -Further workup if LFTs continue to increase    Mood disorder  -Continue home meds    JULIANA  - Wears CPAP at home, okay for hospital CPAP if available  - Oxygen supplementation as needed    SCDs for DVT prophylaxis.  Full code.  Discussed with patient.  Anticipate discharge home in 1-2 days.      Odessa Roman MD  [facility]  06/08/25  11:28 EDT

## 2025-06-08 NOTE — PLAN OF CARE
Goal Outcome Evaluation:     Patient admitted for colitis.  C-diff (neg)  Patient gets tachy with activity.  NS @ 75mL (continuous) and is (L) arm restricted.  Patient sleeping between care.  No consults at this time,

## 2025-06-08 NOTE — NURSING NOTE
Patient continues to c/o nausea.  Placed call to [facility] and told provider patient was given Zofran @ 0007, but also has a phenergan order.  (Both orders are q6).  Provider on-call said to alternate between the two nausea meds and go ahead and give the phenergan.

## 2025-06-09 ENCOUNTER — TELEPHONE (OUTPATIENT)
Dept: GASTROENTEROLOGY | Facility: CLINIC | Age: 65
End: 2025-06-09
Payer: COMMERCIAL

## 2025-06-09 DIAGNOSIS — K63.9 COLON WALL THICKENING: Primary | ICD-10-CM

## 2025-06-09 DIAGNOSIS — K50.919 CROHN'S DISEASE WITH COMPLICATION, UNSPECIFIED GASTROINTESTINAL TRACT LOCATION: ICD-10-CM

## 2025-06-09 PROBLEM — R11.2 NAUSEA & VOMITING: Status: RESOLVED | Noted: 2023-04-11 | Resolved: 2025-06-09

## 2025-06-09 LAB
ALBUMIN SERPL-MCNC: 3.1 G/DL (ref 3.5–5.2)
ALBUMIN/GLOB SERPL: 0.9 G/DL
ALP SERPL-CCNC: 93 U/L (ref 39–117)
ALT SERPL W P-5'-P-CCNC: 20 U/L (ref 1–33)
ANION GAP SERPL CALCULATED.3IONS-SCNC: 11 MMOL/L (ref 5–15)
AST SERPL-CCNC: 30 U/L (ref 1–32)
BILIRUB SERPL-MCNC: 0.4 MG/DL (ref 0–1.2)
BUN SERPL-MCNC: 9 MG/DL (ref 8–23)
BUN/CREAT SERPL: 9.2 (ref 7–25)
CALCIUM SPEC-SCNC: 8.2 MG/DL (ref 8.6–10.5)
CHLORIDE SERPL-SCNC: 105 MMOL/L (ref 98–107)
CO2 SERPL-SCNC: 23 MMOL/L (ref 22–29)
CREAT SERPL-MCNC: 0.98 MG/DL (ref 0.57–1)
DEPRECATED RDW RBC AUTO: 42.8 FL (ref 37–54)
EGFRCR SERPLBLD CKD-EPI 2021: 64.2 ML/MIN/1.73
ERYTHROCYTE [DISTWIDTH] IN BLOOD BY AUTOMATED COUNT: 13.6 % (ref 12.3–15.4)
ERYTHROCYTE [SEDIMENTATION RATE] IN BLOOD: 40 MM/HR (ref 0–30)
GLOBULIN UR ELPH-MCNC: 3.4 GM/DL
GLUCOSE SERPL-MCNC: 75 MG/DL (ref 65–99)
HCT VFR BLD AUTO: 33 % (ref 34–46.6)
HGB BLD-MCNC: 10.9 G/DL (ref 12–15.9)
MCH RBC QN AUTO: 28.2 PG (ref 26.6–33)
MCHC RBC AUTO-ENTMCNC: 33 G/DL (ref 31.5–35.7)
MCV RBC AUTO: 85.5 FL (ref 79–97)
PLATELET # BLD AUTO: 178 10*3/MM3 (ref 140–450)
PMV BLD AUTO: 9.4 FL (ref 6–12)
POTASSIUM SERPL-SCNC: 4 MMOL/L (ref 3.5–5.2)
PROT SERPL-MCNC: 6.5 G/DL (ref 6–8.5)
RBC # BLD AUTO: 3.86 10*6/MM3 (ref 3.77–5.28)
SODIUM SERPL-SCNC: 139 MMOL/L (ref 136–145)
WBC NRBC COR # BLD AUTO: 7.93 10*3/MM3 (ref 3.4–10.8)

## 2025-06-09 PROCEDURE — 99232 SBSQ HOSP IP/OBS MODERATE 35: CPT

## 2025-06-09 PROCEDURE — 25010000002 ONDANSETRON PER 1 MG: Performed by: INTERNAL MEDICINE

## 2025-06-09 PROCEDURE — 85027 COMPLETE CBC AUTOMATED: CPT | Performed by: STUDENT IN AN ORGANIZED HEALTH CARE EDUCATION/TRAINING PROGRAM

## 2025-06-09 PROCEDURE — 63710000001 PROMETHAZINE PER 12.5 MG: Performed by: STUDENT IN AN ORGANIZED HEALTH CARE EDUCATION/TRAINING PROGRAM

## 2025-06-09 PROCEDURE — 25810000003 SODIUM CHLORIDE 0.9 % SOLUTION: Performed by: STUDENT IN AN ORGANIZED HEALTH CARE EDUCATION/TRAINING PROGRAM

## 2025-06-09 PROCEDURE — 80053 COMPREHEN METABOLIC PANEL: CPT | Performed by: STUDENT IN AN ORGANIZED HEALTH CARE EDUCATION/TRAINING PROGRAM

## 2025-06-09 PROCEDURE — 85652 RBC SED RATE AUTOMATED: CPT | Performed by: INTERNAL MEDICINE

## 2025-06-09 PROCEDURE — 63710000001 ONDANSETRON ODT 4 MG TABLET DISPERSIBLE: Performed by: INTERNAL MEDICINE

## 2025-06-09 RX ADMIN — BUSPIRONE HYDROCHLORIDE 15 MG: 15 TABLET ORAL at 21:01

## 2025-06-09 RX ADMIN — BUSPIRONE HYDROCHLORIDE 15 MG: 15 TABLET ORAL at 08:11

## 2025-06-09 RX ADMIN — GABAPENTIN 400 MG: 400 CAPSULE ORAL at 08:13

## 2025-06-09 RX ADMIN — HYDROCODONE BITARTRATE AND ACETAMINOPHEN 1 TABLET: 7.5; 325 TABLET ORAL at 08:12

## 2025-06-09 RX ADMIN — GABAPENTIN 400 MG: 400 CAPSULE ORAL at 17:17

## 2025-06-09 RX ADMIN — HYDROCODONE BITARTRATE AND ACETAMINOPHEN 1 TABLET: 7.5; 325 TABLET ORAL at 21:00

## 2025-06-09 RX ADMIN — ONDANSETRON 4 MG: 2 INJECTION, SOLUTION INTRAMUSCULAR; INTRAVENOUS at 13:12

## 2025-06-09 RX ADMIN — PROMETHAZINE HYDROCHLORIDE 12.5 MG: 12.5 TABLET ORAL at 17:17

## 2025-06-09 RX ADMIN — BUSPIRONE HYDROCHLORIDE 15 MG: 15 TABLET ORAL at 11:28

## 2025-06-09 RX ADMIN — HYDROCODONE BITARTRATE AND ACETAMINOPHEN 1 TABLET: 7.5; 325 TABLET ORAL at 14:04

## 2025-06-09 RX ADMIN — SODIUM CHLORIDE 75 ML/HR: 9 INJECTION, SOLUTION INTRAVENOUS at 02:54

## 2025-06-09 RX ADMIN — GABAPENTIN 400 MG: 400 CAPSULE ORAL at 11:28

## 2025-06-09 RX ADMIN — GABAPENTIN 400 MG: 400 CAPSULE ORAL at 21:01

## 2025-06-09 RX ADMIN — ONDANSETRON 4 MG: 4 TABLET, ORALLY DISINTEGRATING ORAL at 21:00

## 2025-06-09 RX ADMIN — BUSPIRONE HYDROCHLORIDE 15 MG: 15 TABLET ORAL at 17:17

## 2025-06-09 RX ADMIN — AMITRIPTYLINE HYDROCHLORIDE 25 MG: 50 TABLET, FILM COATED ORAL at 21:01

## 2025-06-09 RX ADMIN — HYDROCODONE BITARTRATE AND ACETAMINOPHEN 1 TABLET: 7.5; 325 TABLET ORAL at 02:52

## 2025-06-09 RX ADMIN — DULOXETINE 60 MG: 60 CAPSULE, DELAYED RELEASE ORAL at 21:00

## 2025-06-09 NOTE — CASE MANAGEMENT/SOCIAL WORK
Discharge Planning Assessment  [facility]     Patient Name: Rae Hyde  MRN: [id number]  Today's Date: 6/9/2025    Admit Date: 6/6/2025    Plan: Home   Discharge Needs Assessment       Row Name 06/09/25 1734       Living Environment    People in Home spouse;child(jessica), adult    Current Living Arrangements home    Potentially Unsafe Housing Conditions none    Primary Care Provided by self    Provides Primary Care For no one    Family Caregiver if Needed spouse;child(jessica), adult    Quality of Family Relationships helpful;supportive    Able to Return to Prior Arrangements yes       Resource/Environmental Concerns    Resource/Environmental Concerns none    Transportation Concerns none       Transportation Needs    In the past 12 months, has lack of transportation kept you from medical appointments or from getting medications? no    In the past 12 months, has lack of transportation kept you from meetings, work, or from getting things needed for daily living? No       Food Insecurity    Within the past 12 months, you worried that your food would run out before you got the money to buy more. Never true    Within the past 12 months, the food you bought just didn't last and you didn't have money to get more. Never true       Transition Planning    Patient/Family Anticipates Transition to home    Patient/Family Anticipated Services at Transition none    Transportation Anticipated family or friend will provide;car, drives self       Discharge Needs Assessment    Readmission Within the Last 30 Days no previous admission in last 30 days    Equipment Currently Used at Home cpap    Concerns to be Addressed denies needs/concerns at this time    Do you want help finding or keeping work or a job? I do not need or want help    Do you want help with school or training? For example, starting or completing job training or getting a high school diploma, GED or equivalent No    Anticipated Changes Related to Illness none    Equipment  Needed After Discharge none    Provided Post Acute Provider List? N/A    Provided Post Acute Provider Quality & Resource List? N/A                   Discharge Plan       Row Name 06/09/25 1738       Plan    Plan Home    Plan Comments S/w pt at bedside.  Facesheet info confimed.  Pt lives in a bilevel house w/ her spouse Elan and dtr Kerry, is IADLs and can drive. Her family can assist at home if needed.  Pt uses a CPAP at home.  She also has a cane, walker and w/c she can use if needed, but she does not use them at baseline.  Pt has used [facility] in the past and has never been to a SNF.  Pt plans to return hjome upon DC and denies any DC needs at this time.  CCP will continue to follow. ...........Rae PATEL/ ERIBERTO                    Expected Discharge Date and Time       Expected Discharge Date Expected Discharge Time    Jun 9, 2025            Demographic Summary       Row Name 06/09/25 1733       General Information    Admission Type inpatient    Arrived From home    Referral Source admission list    Reason for Consult discharge planning    Preferred Language English                   Functional Status       Row Name 06/09/25 1733       Functional Status    Usual Activity Tolerance good    Current Activity Tolerance good       Functional Status, IADL    Medications independent    Meal Preparation independent;assistive person    Housekeeping independent;assistive person    Laundry independent;assistive person    Shopping independent;assistive person       Mental Status    General Appearance WDL WDL       Mental Status Summary    Recent Changes in Mental Status/Cognitive Functioning no changes       Employment/    Employment Status employed full-time                               Rae Mclean RN

## 2025-06-09 NOTE — PROGRESS NOTES
Name: Rae Hyde ADMIT: 2025   : 1960  PCP: Rolando Elena MD    MRN: [id number] LOS: 1 days   AGE/SEX: 64 y.o. female  ROOM: [facility]     Subjective   Subjective   She was feeling better this morning.  Requested advancement of diet but apparently got more nauseated after eating and now feels bad again       Objective   Objective   Vital Signs  Temp:  [97.5 °F (36.4 °C)-97.9 °F (36.6 °C)] 97.5 °F (36.4 °C)  Heart Rate:  [54-63] 54  Resp:  [16] 16  BP: (127-137)/(55-67) 127/55  SpO2:  [92 %-96 %] 96 %  on  Flow (L/min) (Oxygen Therapy):  [2] 2;   Device (Oxygen Therapy): nasal cannula  Body mass index is 43.59 kg/m².  Physical Exam  Vitals reviewed.   Constitutional:       General: She is not in acute distress.     Appearance: She is obese.   Cardiovascular:      Rate and Rhythm: Normal rate and regular rhythm.   Pulmonary:      Effort: No respiratory distress.      Breath sounds: Normal breath sounds. No wheezing.   Abdominal:      Palpations: Abdomen is soft.      Tenderness: There is abdominal tenderness.   Musculoskeletal:      Right lower leg: No edema.      Left lower leg: No edema.   Skin:     General: Skin is warm and dry.   Neurological:      Mental Status: She is alert and oriented to person, place, and time.   Psychiatric:         Mood and Affect: Mood normal.       Results Review     I reviewed the patient's new clinical results.  Results from last 7 days   Lab Units 25  0539 25  0731 25  0530 25  1322   WBC 10*3/mm3 7.93 7.73 9.81 10.85*   HEMOGLOBIN g/dL 10.9* 11.1* 11.0* 14.5   PLATELETS 10*3/mm3 178 127* 195 249     Results from last 7 days   Lab Units 25  0539 25  0731 25  0530 25  1322   SODIUM mmol/L 139 137 138 140   POTASSIUM mmol/L 4.0 4.7 3.6 4.3   CHLORIDE mmol/L 105 105 105 98   CO2 mmol/L 23.0 20.3* 23.1 26.4   BUN mg/dL 9.0 11.0 15.0 15.0   CREATININE mg/dL 0.98 1.29* 1.24* 1.47*   GLUCOSE mg/dL 75 77 101* 93  "  EGFR mL/min/1.73 64.2 46.2* 48.4* 39.5*     Results from last 7 days   Lab Units 06/09/25  0539 06/08/25  0731 06/06/25  1322   ALBUMIN g/dL 3.1* 3.3* 4.2   BILIRUBIN mg/dL 0.4 0.4 0.9   ALK PHOS U/L 93 93 133*   AST (SGOT) U/L 30 34* 44*   ALT (SGPT) U/L 20 23 33     Results from last 7 days   Lab Units 06/09/25  0539 06/08/25  0731 06/07/25  0530 06/06/25  1322   CALCIUM mg/dL 8.2* 8.3* 8.2* 10.0   ALBUMIN g/dL 3.1* 3.3*  --  4.2     Results from last 7 days   Lab Units 06/06/25  1614 06/06/25  1322   LACTATE mmol/L 1.0 3.4*     No results found for: \"HGBA1C\", \"POCGLU\"    No radiology results for the last day    I have personally reviewed all medications:  Scheduled Medications  amitriptyline, 25 mg, Oral, Nightly  busPIRone, 15 mg, Oral, 4x Daily  DULoxetine, 60 mg, Oral, Daily  gabapentin, 400 mg, Oral, 4x Daily  metoprolol succinate XL, 25 mg, Oral, Nightly  Scopolamine, 1 patch, Transdermal, Q72H    Infusions   Diet  Diet: Gastrointestinal; Fiber-Restricted; Texture: Soft to Chew (NDD 3); Soft to Chew: Whole Meat; Fluid Consistency: Thin (IDDSI 0)    I have personally reviewed:  [x]  Laboratory   [x]  Microbiology   [x]  Radiology   [x]  EKG/Telemetry  [x]  Cardiology/Vascular   []  Pathology    []  Records       Assessment/Plan     Active Hospital Problems    Diagnosis  POA    **Colitis [K52.9]  Yes    Hypertension [I10]  Yes    Crohn's disease [K50.90]  Yes      Resolved Hospital Problems    Diagnosis Date Resolved POA    Nausea & vomiting [R11.2] 06/09/2025 Yes       64 y.o. female with history of Crohn's admitted with Colitis.    Not clear if colitis related to Crohn's versus infectious pathogen although GI PCR and C. difficile negative.  Attempted to advance diet and discharge but she has not done well with that thus far.  Will monitor overnight, continue antiemetics as needed and reevaluate for potential DC tomorrow  - GI signed off  - Fecal calprotectin not obtained as patient has not had a stool.  " ESR slightly elevated at 40 although not severely high at her age    Hypertension stable    Renal function at or below usual baseline    JULIANA: Does not have CPAP or but using oxygen as needed      DVT prophy: SCDs  Disposition: Hold DC today, reeval tomorrow  Discussed with DIGNA Sharp MD  [facility]  06/09/25  16:00 EDT

## 2025-06-09 NOTE — PROGRESS NOTES
[facility]  Inpatient Progress Note    Reason for Follow Up:  Abdominal pain     Subjective     Interval History:   Reports she has been feeling really well today.  Tolerating clear liquid diet and hopeful to advance to something more solid.  She is having some abdominal discomfort but states it has overall improved.  No diarrhea or black or bloody stool.    Current Facility-Administered Medications:     acetaminophen (TYLENOL) tablet 650 mg, 650 mg, Oral, Q4H PRN, 650 mg at 06/08/25 1330 **OR** acetaminophen (TYLENOL) 160 MG/5ML oral solution 650 mg, 650 mg, Oral, Q4H PRN **OR** acetaminophen (TYLENOL) suppository 650 mg, 650 mg, Rectal, Q4H PRN, Michaelle Oviedo MD    albuterol (PROVENTIL) nebulizer solution 0.083% 2.5 mg/3mL, 2.5 mg, Nebulization, Q6H PRN, Michaelle Oviedo MD    amitriptyline (ELAVIL) tablet 25 mg, 25 mg, Oral, Nightly, Michaelle Oviedo MD, 25 mg at 06/08/25 2006    busPIRone (BUSPAR) tablet 15 mg, 15 mg, Oral, 4x Daily, Michaelle Oviedo MD, 15 mg at 06/08/25 2006    DULoxetine (CYMBALTA) DR capsule 60 mg, 60 mg, Oral, Daily, Michaelle Oviedo MD, 60 mg at 06/08/25 2010    gabapentin (NEURONTIN) capsule 400 mg, 400 mg, Oral, 4x Daily, Michaelle Oviedo MD, 400 mg at 06/08/25 2007    HYDROcodone-acetaminophen (NORCO) 7.5-325 MG per tablet 1 tablet, 1 tablet, Oral, Q6H PRN, Michaelle Oviedo MD, 1 tablet at 06/09/25 0252    hyoscyamine (LEVSIN) SL tablet 125 mcg, 125 mcg, Sublingual, Q6H PRN, Michaelle Oviedo MD    melatonin tablet 2.5 mg, 2.5 mg, Oral, Nightly PRN, Michaelle Oviedo MD    metoprolol succinate XL (TOPROL-XL) 24 hr tablet 25 mg, 25 mg, Oral, Nightly, Michaelle Oviedo MD, 25 mg at 06/08/25 2007    ondansetron ODT (ZOFRAN-ODT) disintegrating tablet 4 mg, 4 mg, Oral, Q6H PRN, 4 mg at 06/08/25 2045 **OR** ondansetron (ZOFRAN) injection 4 mg, 4 mg, Intravenous, Q6H PRN, Michaelle Oviedo MD, 4 mg at  06/08/25 1439    promethazine (PHENERGAN) tablet 12.5 mg, 12.5 mg, Oral, Q6H PRN, 12.5 mg at 06/08/25 0423 **OR** promethazine (PHENERGAN) suppository 12.5 mg, 12.5 mg, Rectal, Q6H PRN, Odessa Roman MD    scopolamine patch 1 mg/72 hr, 1 patch, Transdermal, Q72H, Jesse Gonzales MD, 1 patch at 06/08/25 0813    sodium chloride 0.9 % flush 10 mL, 10 mL, Intravenous, PRN, Juhi Elmore MD    sodium chloride 0.9 % infusion, 75 mL/hr, Intravenous, Continuous, Odessa Roman MD, Last Rate: 75 mL/hr at 06/09/25 0254, 75 mL/hr at 06/09/25 0254    Objective     Vital Signs  Temp:  [97.5 °F (36.4 °C)-97.9 °F (36.6 °C)] 97.9 °F (36.6 °C)  Heart Rate:  [61-73] 62  Resp:  [16-18] 16  BP: (129-138)/(58-67) 132/65  Body mass index is 43.59 kg/m².    Intake/Output Summary (Last 24 hours) at 6/9/2025 0743  Last data filed at 6/9/2025 0254  Gross per 24 hour   Intake 670 ml   Output --   Net 670 ml     No intake/output data recorded.     Physical Exam:   General: patient awake, alert and cooperative   Eyes: Normal lids and lashes, no scleral icterus   Skin: warm and dry, not jaundiced   Pulm: regular and unlabored   Abdomen: soft, nontender, nondistended;     Results Review:     I reviewed the patient's new clinical results.    Results from last 7 days   Lab Units 06/09/25  0539 06/08/25  0731 06/07/25  0530   WBC 10*3/mm3 7.93 7.73 9.81   HEMOGLOBIN g/dL 10.9* 11.1* 11.0*   HEMATOCRIT % 33.0* 33.8* 33.4*   PLATELETS 10*3/mm3 178 127* 195     Results from last 7 days   Lab Units 06/09/25  0539 06/08/25  0731 06/07/25  0530 06/06/25  1322   SODIUM mmol/L 139 137 138 140   POTASSIUM mmol/L 4.0 4.7 3.6 4.3   CHLORIDE mmol/L 105 105 105 98   CO2 mmol/L 23.0 20.3* 23.1 26.4   BUN mg/dL 9.0 11.0 15.0 15.0   CREATININE mg/dL 0.98 1.29* 1.24* 1.47*   CALCIUM mg/dL 8.2* 8.3* 8.2* 10.0   BILIRUBIN mg/dL 0.4 0.4  --  0.9   ALK PHOS U/L 93 93  --  133*   ALT (SGPT) U/L 20 23  --  33   AST (SGOT) U/L 30 34*  --  44*   GLUCOSE mg/dL 75 77  101* 93         Lab Results   Lab Value Date/Time    LIPASE 29 06/06/2025 1322    LIPASE 19 04/10/2023 1754    LIPASE 125 (H) 07/10/2015 1746       Radiology:  CT Abdomen Pelvis With Contrast   Final Result       1. Small amount of colonic wall thickening with gas and fluid in the   colonic lumen. Suspect colitis and diarrhea.   2. Mild periportal adenopathy is similar to prior and therefore most   likely reactive. Finding can be correlated with liver function tests.       This report was finalized on 6/6/2025 3:36 PM by Dr. Leon Delacruz M.D   on Workstation: [id number]              Assessment & Plan     Active Hospital Problems    Diagnosis     **Colitis     Nausea & vomiting     Hypertension     Crohn's disease        Assessment:  Nausea/vomiting/diarrhea  Abdominal pain   History of Crohns     All problems are new to me today    Plan:  Stool studies negative.  Fecal calprotectin is still pending.  Sed rate is elevated but her symptoms are overall improving.  She is tolerating clear liquid diet well.  She did have some colonic wall thickening on her CT scan so she would benefit from a colonoscopy in several weeks-this could be done outpatient - will send a message to get this scheduled.   Ok to advance diet as tolerated from GI standpoint    At this time, GI will sign off. Please do not hesitate to call back if needed.     Patient and plan of care discussed w/ attending, Dr. Yennifer Justice   [facility]  [phone]     Stable

## 2025-06-09 NOTE — PLAN OF CARE
Problem: Pain Acute  Goal: Optimal Pain Control and Function  Outcome: Progressing  Intervention: Optimize Psychosocial Wellbeing  Recent Flowsheet Documentation  Taken 6/8/2025 2000 by Edd Braun, RN  Diversional Activities: television     Problem: Nausea and Vomiting  Goal: Nausea and Vomiting Relief  Intervention: Prevent and Manage Nausea and Vomiting  Recent Flowsheet Documentation  Taken 6/8/2025 2145 by Edd Braun, RN  Nausea/Vomiting Interventions: medication given   Goal Outcome Evaluation:      Patient had complain of nausea ,relieved by Zofran.         She is also having abdominal pain requiring Norco Q6.

## 2025-06-09 NOTE — PLAN OF CARE
Goal Outcome Evaluation:  Plan of Care Reviewed With: patient         Pt had nausea and abd discomfort after reg diet. VSS

## 2025-06-09 NOTE — PAYOR COMM NOTE
"Rae Hyde (64 y.o. Female)     PLEASE SEE ATTACHED FOR INPT AUTH     PT WAS OBS ON 6/6  CHANGED TO INPT ON 6/8    REF #   [id number]    PLEASE CALL REBECCA BRANDT RN/ DEPT @ [phone]  OR FAX  DEPARTMENT @  [phone]    THANK YOU   REBECCA BRANDT RN  [facility]          Date of Birth   1960    Social Security Number       Address   [address] [place], [place] [address] [address]    Home Phone   [phone]    MRN   [id number]       Advent   Quaker    Marital Status                               Admission Date   6/6/2025 OBS   6/8/25 INPT     Admission Type   Emergency    Admitting Provider   Michaelle Oviedo MD    Attending Provider   Raman Sharp MD    Department, Room/Bed   [facility] 5 [facility], S509/1       Discharge Date       Discharge Disposition   Home or Self Care    Discharge Destination                                 Attending Provider: Raman Sharp MD    Allergies: Latex, Peg 3350-kcl-na Bicarb-nacl, Pravastatin, Simvastatin    Isolation: None   Infection: None   Code Status: CPR    Ht: 162.6 cm (64\")   Wt: 115 kg (253 lb 15.5 oz)    Admission Cmt: None   Principal Problem: Colitis [K52.9]                   Active Insurance as of 6/6/2025       Primary Coverage       Payor Plan Insurance Group Employer/Plan Group    ANTHEM BLUE CROSS ANTHEM BLUE [employer] BLUE SHIELD PPO [id number]       Payor Plan Address Payor Plan Phone Number Payor Plan Fax Number Effective Dates    [address] [phone]  6/1/2018 - None Entered    [place] [address]         Subscriber Name Subscriber Birth Date Member ID       RAE HYDE 1960 [id number]                     Emergency Contacts        (Rel.) Home Phone Work Phone Mobile Phone    [place] esposito (Spouse) -- -- [phone]    Kerry Hyde (Daughter) -- -- [phone]    MayTammy (Daughter) -- -- --    Mary Shahid (Daughter) -- -- --              [place]: " NPI [id number]  Tax ID [id number]     History & Physical        Stingl, Michaelle Sanz MD at 25          HISTORY AND PHYSICAL   [facility]        Date of Admission: 2025  Patient Identification:  Name: Rae Hyde  Age: 64 y.o.  Sex: female  :  1960  MRN: [id number]                     Primary Care Physician: Rolando Elena MD    Chief Complaint:  64 year old female presented to the emergency room with diarrhea, nausea, vomiting and abdominal pain; she has had some weight loss; she has a history of crohns disease; she reported that she has been very stressed due to taking care of her parents; denies fever or chills; no black or bloody stool    History of Present Illness:   As above    Past Medical History:  Past Medical History:   Diagnosis Date    Arthritis     Asthma     CAD (coronary artery disease)     Chest pain     Crohn's disease     Depression     Hyperlipidemia     Hypertension     Migraine     Precordial pain     Sleep apnea     Snoring      Past Surgical History:  Past Surgical History:   Procedure Laterality Date    ABDOMINAL SURGERY      CARDIAC CATHETERIZATION      CARDIAC CATHETERIZATION Left 2018    Procedure: Cardiac Catheterization/Vascular Study;  Surgeon: Sukh Fisher MD;  Location: [facility] CATH INVASIVE LOCATION;  Service: Cardiovascular    CARDIAC CATHETERIZATION N/A 2018    Procedure: Left Heart Cath;  Surgeon: Sukh Fisher MD;  Location: [facility] CATH INVASIVE LOCATION;  Service: Cardiovascular    CARDIAC CATHETERIZATION N/A 2018    Procedure: Coronary angiography;  Surgeon: Sukh Fisher MD;  Location: [facility] CATH INVASIVE LOCATION;  Service: Cardiovascular    CARDIAC CATHETERIZATION N/A 2018    Procedure: Left ventriculography;  Surgeon: Sukh Fisher MD;  Location: [facility] CATH INVASIVE LOCATION;  Service: Cardiovascular    CHOLECYSTECTOMY      HERNIA REPAIR      HYSTERECTOMY      JOINT  REPLACEMENT      KNEE SURGERY        Home Meds:  Facility-Administered Medications Prior to Admission   Medication Dose Route Frequency Provider Last Rate Last Admin    nitroglycerin (NITROSTAT) SL tablet 0.4 mg  0.4 mg Sublingual Q5 Min Sy Woodruff III, MD   0.4 mg at 06/12/18 1517     Medications Prior to Admission   Medication Sig Dispense Refill Last Dose/Taking    amitriptyline (ELAVIL) 25 MG tablet Take 1 tablet by mouth Every Night.   6/5/2025 Evening    busPIRone (BUSPAR) 15 MG tablet Take 1 tablet by mouth 4 (Four) Times a Day.   6/6/2025 Morning    butalbital-acetaminophen-caffeine (FIORICET, ESGIC) -40 MG per tablet Take 2 tablets by mouth Every 6 (Six) Hours As Needed for Headache or Migraine. 10 tablet 0 Past Week    cyanocobalamin 1000 MCG/ML injection    5/15/2025    DULoxetine (CYMBALTA) 60 MG capsule Take 1 capsule by mouth Daily.   6/5/2025 Evening    ezetimibe (ZETIA) 10 MG tablet Take 1 tablet by mouth Daily.   6/5/2025 Evening    gabapentin (NEURONTIN) 400 MG capsule Take 1 capsule by mouth 4 (Four) Times a Day.   6/6/2025 Morning    HYDROcodone-acetaminophen (NORCO) 7.5-325 MG per tablet Take 1 tablet by mouth Every 6 (Six) Hours As Needed.   6/5/2025 Bedtime    hydrOXYzine (ATARAX) 25 MG tablet TAKE ONE TABLET BY MOUTH AT NIGHT IF NEEDED FOR ITCHING   Past Week Bedtime    meclizine (ANTIVERT) 25 MG tablet Take 1 tablet by mouth 3 (Three) Times a Day As Needed for Dizziness or Nausea. 20 tablet 0 Taking As Needed    metoprolol succinate XL (TOPROL-XL) 25 MG 24 hr tablet TAKE 1 TABLET DAILY 90 tablet 0 6/5/2025 Evening    promethazine (PHENERGAN) 25 MG tablet Take 1 tablet by mouth Every 6 (Six) Hours As Needed.   6/6/2025 Morning    ubrogepant (UBRELVY) 100 MG tablet Take 1 tablet by mouth Daily As Needed.   Past Week Morning    albuterol sulfate  (90 Base) MCG/ACT inhaler Inhale 2 puffs.       aspirin 81 MG tablet Take 1 tablet by mouth Daily. (Patient not taking: Reported  on 9/13/2024)       hyoscyamine sulfate (ANASPAZ) 0.125 MG tablet dispersible disintegrating tablet Place 1 tablet on the tongue 3 (Three) Times a Day As Needed.       Qulipta 60 MG tablet Take 1 tablet by mouth Daily.       SUMAtriptan (IMITREX) 50 MG tablet Take one tablet at onset of headache. May repeat dose one time in 2 hours if headache not relieved. (Patient not taking: Reported on 9/13/2024) 10 tablet 1        Allergies:  Allergies   Allergen Reactions    Latex Rash and Hives    Peg 3350-Kcl-Na Bicarb-Nacl Rash and Hives     Possibly container?    Pravastatin Nausea Only    Simvastatin Nausea Only     Immunizations:  Immunization History   Administered Date(s) Administered    COVID-19 (PFIZER) BIVALENT 12+YRS 10/03/2022    COVID-19 (PFIZER) Purple Cap Monovalent 03/06/2021, 03/27/2021, 11/27/2021     Social History:   Social History     Social History Narrative    Not on file     Social History     Socioeconomic History    Marital status:    Tobacco Use    Smoking status: Former     Current packs/day: 0.25     Average packs/day: 0.3 packs/day for 1 year (0.3 ttl pk-yrs)     Types: Cigarettes    Smokeless tobacco: Never    Tobacco comments:     caffeine use   Vaping Use    Vaping status: Never Used   Substance and Sexual Activity    Alcohol use: No    Drug use: No    Sexual activity: Defer       Family History:  Family History   Problem Relation Age of Onset    Hypertension Mother     Atrial fibrillation Mother     Hypertension Father     Diabetes Maternal Grandmother     Diabetes Maternal Grandfather     Diabetes Paternal Grandmother     Diabetes Paternal Grandfather         Review of Systems  See history of present illness and past medical history.  Patient denies headache, dizziness, syncope, falls, trauma, change in vision, change in hearing, change in taste, focal weakness, numbness, or paresthesia.  Patient denies chest pain, palpitations, dyspnea, orthopnea, PND, cough, sinus pressure,  "rhinorrhea, epistaxis, hemoptysis, hematemesis,  constipation or hematochezia.  Denies cold or heat intolerance, polydipsia, polyuria, polyphagia. Denies hematuria, pyuria, dysuria, hesitancy, frequency or urgency. Denies consumption of raw and under cooked meats foods or change in water source.       Objective:  T Max 24 hrs: Temp (24hrs), Av.9 °F (37.2 °C), Min:98.2 °F (36.8 °C), Max:100 °F (37.8 °C)    Vitals Ranges:   Temp:  [98.2 °F (36.8 °C)-100 °F (37.8 °C)] 99 °F (37.2 °C)  Heart Rate:  [] 95  Resp:  [14-20] 20  BP: (106-156)/(54-86) 106/54      Exam:  /54 (BP Location: Right arm, Patient Position: Lying)   Pulse 95   Temp 99 °F (37.2 °C) (Oral)   Resp 20   Ht 162.6 cm (64\")   Wt 115 kg (253 lb 11.2 oz)   SpO2 95%   BMI 43.55 kg/m²     General Appearance:    Alert, cooperative, no distress, appears stated age   Head:    Normocephalic, without obvious abnormality, atraumatic   Eyes:    PERRL, conjunctivae/corneas clear, EOM's intact, both eyes   Ears:    Normal external ear canals, both ears   Nose:   Nares normal, septum midline, mucosa normal, no drainage    or sinus tenderness   Throat:   Lips, mucosa, and tongue normal   Neck:   Supple, symmetrical, trachea midline, no adenopathy;     thyroid:  no enlargement/tenderness/nodules; no carotid    bruit or JVD   Back:     Symmetric, no curvature, ROM normal, no CVA tenderness   Lungs:     Clear to auscultation bilaterally, respirations unlabored   Chest Wall:    No tenderness or deformity    Heart:    Regular rate and rhythm, S1 and S2 normal, no murmur, rub   or gallop   Abdomen:     Soft, nontender, bowel sounds active all four quadrants,     no masses, no hepatomegaly, no splenomegaly   Extremities:   Extremities normal, atraumatic, no cyanosis or edema                       .    Data Review:  Labs in chart were reviewed.  WBC   Date Value Ref Range Status   2025 10.85 (H) 3.40 - 10.80 10*3/mm3 Final     Hemoglobin   Date Value " Ref Range Status   06/06/2025 14.5 12.0 - 15.9 g/dL Final     Hematocrit   Date Value Ref Range Status   06/06/2025 45.8 34.0 - 46.6 % Final     Platelets   Date Value Ref Range Status   06/06/2025 249 140 - 450 10*3/mm3 Final     Sodium   Date Value Ref Range Status   06/06/2025 140 136 - 145 mmol/L Final     Potassium   Date Value Ref Range Status   06/06/2025 4.3 3.5 - 5.2 mmol/L Final     Chloride   Date Value Ref Range Status   06/06/2025 98 98 - 107 mmol/L Final     CO2   Date Value Ref Range Status   06/06/2025 26.4 22.0 - 29.0 mmol/L Final     BUN   Date Value Ref Range Status   06/06/2025 15.0 8.0 - 23.0 mg/dL Final     Creatinine   Date Value Ref Range Status   06/06/2025 1.47 (H) 0.57 - 1.00 mg/dL Final     Glucose   Date Value Ref Range Status   06/06/2025 93 65 - 99 mg/dL Final     Calcium   Date Value Ref Range Status   06/06/2025 10.0 8.6 - 10.5 mg/dL Final     AST (SGOT)   Date Value Ref Range Status   06/06/2025 44 (H) 1 - 32 U/L Final     ALT (SGPT)   Date Value Ref Range Status   06/06/2025 33 1 - 33 U/L Final     Alkaline Phosphatase   Date Value Ref Range Status   06/06/2025 133 (H) 39 - 117 U/L Final                Imaging Results (All)       Procedure Component Value Units Date/Time    CT Abdomen Pelvis With Contrast [[id number]] Collected: 06/06/25 1524     Updated: 06/06/25 1539    Narrative:      CT ABDOMEN PELVIS W CONTRAST-     INDICATION: Upper abdominal pain, with nausea and diarrhea     COMPARISON: CT abdomen pelvis April 10, 2023     TECHNIQUE:  Routine CT abdomen and pelvis with IV contrast. Coronal and sagittal  reformats. Radiation dose reduction techniques were utilized, including  automated exposure control and exposure modulation based on body size.     FINDINGS:      Lung bases: Subsegmental atelectasis at the bases.     ABDOMEN: Normal liver. Cholecystectomy. No biliary ductal dilatation.  Spleen is normal in size. Mild to moderate pancreatic atrophy. No  pancreatic ductal  dilatation or mass seen. No adrenal nodules. No  solid-appearing renal mass or hydronephrosis.     Pelvis: No bladder calculus. Hysterectomy. No adnexal mass. Surgical  clips in the pelvis.     Bowel: Small lipoma at the duodenal jejunal flexure, series 2, axial  mage 50, measures 1.5 cm. No small bowel obstruction. Rectum is  collapsed. Gas and fluid in the colonic lumen. Colon is under distended.  Suspect a small amount of colonic wall thickening. Normal appendix.  Abdominal wall: Rectus diastases. Periumbilical and pelvic wall  scarring.     Retroperitoneum: No retroperitoneal lymphadenopathy. Periportal  lymphadenopathy. For example, periportal lymph node, series 2, axial  mage 40, measures 1.1 cm, unchanged. For example, a portal caval lymph  node, series 2, axial mage 45, measures 1.3 cm, unchanged.     Vasculature: Patent. No abdominal aortic aneurysm. Mild aortic  atherosclerotic calcification.     Osseous structures: Bilateral femoral head osteophyte lipping.  Exaggerated lower lumbar lordosis. Bilateral L5 pars defects with slight  anterolisthesis of L5 on S1. Lower lumbar facet degenerative  arthropathy.       Impression:         1. Small amount of colonic wall thickening with gas and fluid in the  colonic lumen. Suspect colitis and diarrhea.  2. Mild periportal adenopathy is similar to prior and therefore most  likely reactive. Finding can be correlated with liver function tests.     This report was finalized on 6/6/2025 3:36 PM by Dr. Leon Delacruz M.D  on Workstation: [id number]                 Assessment:  Active Hospital Problems    Diagnosis  POA    **Colitis [K52.9]  Yes      Resolved Hospital Problems   No resolved problems to display.   Diarrhea  Crohns disease  Cad  Hypertension  Hyperlipidemia  Sleep apnea  [place]ant  Ckd3  obesity    Plan:  Will ask gi to see her  Fluids  Awaiting stool studies  Trend labs  Monitor on telemetry  Dw patient, ed provider  Patient is full code and spouse is  kaylin Oviedo MD  6/6/2025  20:30 EDT      Electronically signed by Michaelle Oviedo MD at 06/06/25 2035          Emergency Department Notes        Za Benjamin, RN at 06/06/25 1615          Nursing report ED to floor  Rae Hyde  64 y.o.  female    HPI :  HPI  Stated Reason for Visit: ABDpain  History Obtained From: patient    Chief Complaint  Chief Complaint   Patient presents with    Abdominal Pain    Nausea    Vomiting       Admitting doctor:   Michaelle Oviedo MD    Admitting diagnosis:   The primary encounter diagnosis was Pain of upper abdomen. Diagnoses of Nausea, Diarrhea, unspecified type, YECENIA (acute kidney injury), and Colitis were also pertinent to this visit.    Code status:   Current Code Status       Date Active Code Status Order ID Comments User Context       6/6/2025 1603 CPR (Attempt to Resuscitate) [id number]  Michaelle Oviedo MD ED        Question Answer    Code Status (Patient has no pulse and is not breathing) CPR (Attempt to Resuscitate)    Medical Interventions (Patient has pulse or is breathing) Full                    Allergies:   Latex, Peg 3350-kcl-na bicarb-nacl, Pravastatin, and Simvastatin    Isolation:   Contact Spore    Intake and Output    Intake/Output Summary (Last 24 hours) at 6/6/2025 1616  Last data filed at 6/6/2025 1615  Gross per 24 hour   Intake 2000 ml   Output --   Net 2000 ml       Weight:   There were no vitals filed for this visit.    Most recent vitals:   Vitals:    06/06/25 1325 06/06/25 1500 06/06/25 1501 06/06/25 1515   BP: 156/69 148/60 126/72 126/72   Patient Position:       Pulse: 107 103 102 101   Resp: 20 18     Temp:  98.4 °F (36.9 °C)     TempSrc:       SpO2: 92% 95% 96% 93%       Active LDAs/IV Access:   Lines, Drains & Airways       Active LDAs       Name Placement date Placement time Site Days    Peripheral IV 06/06/25 1322 20 G Anterior;Distal;Right;Upper Arm 06/06/25  1322  Arm  less than 1                    Labs  (abnormal labs have a star):   Labs Reviewed   COMPREHENSIVE METABOLIC PANEL - Abnormal; Notable for the following components:       Result Value    Creatinine 1.47 (*)     Total Protein 8.6 (*)     AST (SGOT) 44 (*)     Alkaline Phosphatase 133 (*)     Anion Gap 15.6 (*)     eGFR 39.5 (*)     All other components within normal limits    Narrative:     GFR Categories in Chronic Kidney Disease (CKD)              GFR Category          GFR (mL/min/1.73)    Interpretation  G1                    90 or greater        Normal or high (1)  G2                    60-89                Mild decrease (1)  G3a                   45-59                Mild to moderate decrease  G3b                   30-44                Moderate to severe decrease  G4                    15-29                Severe decrease  G5                    14 or less           Kidney failure    (1)In the absence of evidence of kidney disease, neither GFR category G1 or G2 fulfill the criteria for CKD.    eGFR calculation 2021 CKD-EPI creatinine equation, which does not include race as a factor   LACTIC ACID, PLASMA - Abnormal; Notable for the following components:    Lactate 3.4 (*)     All other components within normal limits   CBC WITH AUTO DIFFERENTIAL - Abnormal; Notable for the following components:    WBC 10.85 (*)     Neutrophil % 93.0 (*)     Lymphocyte % 4.9 (*)     Monocyte % 0.8 (*)     Neutrophils, Absolute 10.10 (*)     Lymphocytes, Absolute 0.53 (*)     Monocytes, Absolute 0.09 (*)     All other components within normal limits   C-REACTIVE PROTEIN - Abnormal; Notable for the following components:    C-Reactive Protein 0.61 (*)     All other components within normal limits   LIPASE - Normal   GASTROINTESTINAL PANEL, PCR (PREFERRED) DOES NOT INCLUDE CDIFF   CLOSTRIDIOIDES DIFFICILE TOXIN    Narrative:     The following orders were created for panel order Clostridioides difficile Toxin - Stool, Per Rectum.  Procedure                                Abnormality         Status                     ---------                               -----------         ------                     Clostridioides difficile...[[id number]]                                                   Please view results for these tests on the individual orders.   CLOSTRIDIOIDES DIFFICILE TOXIN, PCR   BLOOD CULTURE   BLOOD CULTURE   RAINBOW DRAW    Narrative:     The following orders were created for panel order [place] Draw.  Procedure                               Abnormality         Status                     ---------                               -----------         ------                     Green Top (Gel)[[id number]]                                  Final result               Lavender Top[[id number]]                                     Final result               Gold Top - SST[[id number]]                                   Final result               Light Blue Top[[id number]]                                   Final result                 Please view results for these tests on the individual orders.   URINALYSIS W/ MICROSCOPIC IF INDICATED (NO CULTURE)   LACTIC ACID, REFLEX   CBC AND DIFFERENTIAL    Narrative:     The following orders were created for panel order CBC & Differential.  Procedure                               Abnormality         Status                     ---------                               -----------         ------                     CBC Auto Differential[[id number]]        Abnormal            Final result                 Please view results for these tests on the individual orders.   GREEN TOP   LAVENDER TOP   GOLD TOP - SST   LIGHT BLUE TOP       EKG:   No orders to display       Meds given in ED:   Medications   sodium chloride 0.9 % flush 10 mL (has no administration in time range)   acetaminophen (TYLENOL) tablet 650 mg (has no administration in time range)     Or   acetaminophen (TYLENOL) 160 MG/5ML oral solution 650 mg (has no administration in time range)     Or    acetaminophen (TYLENOL) suppository 650 mg (has no administration in time range)   ondansetron ODT (ZOFRAN-ODT) disintegrating tablet 4 mg (has no administration in time range)     Or   ondansetron (ZOFRAN) injection 4 mg (has no administration in time range)   melatonin tablet 2.5 mg (has no administration in time range)   sodium chloride 0.9 % bolus 1,000 mL (0 mL Intravenous Stopped 6/6/25 1427)   ondansetron (ZOFRAN) injection 4 mg (4 mg Intravenous Given 6/6/25 1322)   morphine injection 4 mg (4 mg Intravenous Given 6/6/25 1323)   sodium chloride 0.9 % bolus 1,000 mL (0 mL Intravenous Stopped 6/6/25 1615)   droperidol (INAPSINE) injection 1.25 mg (1.25 mg Intravenous Given 6/6/25 1435)   iopamidol (ISOVUE-300) 61 % injection 100 mL (85 mL Intravenous Given by Other 6/6/25 1444)   morphine injection 4 mg (4 mg Intravenous Given 6/6/25 1555)       Imaging results:  CT Abdomen Pelvis With Contrast  Result Date: 6/6/2025   1. Small amount of colonic wall thickening with gas and fluid in the colonic lumen. Suspect colitis and diarrhea. 2. Mild periportal adenopathy is similar to prior and therefore most likely reactive. Finding can be correlated with liver function tests.  This report was finalized on 6/6/2025 3:36 PM by Dr. Leon Delacruz M.D on Workstation: [id number]        Ambulatory status:   - assist    Social issues:   Social History     Socioeconomic History    Marital status:    Tobacco Use    Smoking status: Former     Current packs/day: 0.25     Average packs/day: 0.3 packs/day for 1 year (0.3 ttl pk-yrs)     Types: Cigarettes    Smokeless tobacco: Never    Tobacco comments:     caffeine use   Vaping Use    Vaping status: Never Used   Substance and Sexual Activity    Alcohol use: No    Drug use: No    Sexual activity: Defer       Peripheral Neurovascular  Peripheral Neurovascular (Adult)  Peripheral Neurovascular WDL: WDL    Neuro Cognitive  Neuro Cognitive (Adult)  Cognitive/Neuro/Behavioral  WDL: WDL    Learning  Learning Assessment  Learning Readiness and Ability: no barriers identified    Respiratory  Respiratory  Airway WDL: WDL  Respiratory WDL  Respiratory WDL: WDL    Abdominal Pain       Pain Assessments  Pain (Adult)  (0-10) Pain Rating: Rest: 9  (0-10) Pain Rating: Activity: 9  Pain Location: abdomen    NIH Stroke Scale       Za Benjamin RN  06/06/25 16:16 EDT     Electronically signed by aZ Benjamin RN at 06/06/25 1616       Juhi Elmore MD at 06/06/25 1256           EMERGENCY DEPARTMENT ENCOUNTER    Room Number:  S509/1  PCP: Rolando Elena MD  Independent Historians: Patient and Family    HPI:  Chief Complaint: had concerns including Abdominal Pain, Nausea, and Vomiting.      A complete HPI/ROS/PMH/PSH/SH/FH are unobtainable due to: None    Chronic or social conditions impacting patient care (Social Determinants of Health): None      Context: Rae Hyde is a 64 y.o. female with a medical history of pretension, coronary artery disease, Crohn's disease, colitis, JACKSON, sleep apnea who presents to the ED c/o acute upper abdominal pain with nausea and decreased oral intake since Tuesday.  Patient remarks having lost 11 pounds since Tuesday and having diarrhea.  Patient denies any bloody stools.  She denies any fevers or bad foods.  Patient's prior surgical history includes a hysterectomy and a cholecystectomy as well as a hernia repair with mesh removal thereafter.  Patient does report a history of pancreatitis in the past as well  She remarks being under tremendous stress taking care of her elderly parents.  She is concerned she might have developed an ulcer.        Review of prior external notes (non-ED) -and- Review of prior external test results outside of this encounter: Patient seen by primary provider earlier today and deferred to come to the emergency department for further evaluation due to the severity of her symptoms.    Prescription drug monitoring  program review:         PAST MEDICAL HISTORY  Active Ambulatory Problems     Diagnosis Date Noted    Precordial pain 06/13/2018    CAD (coronary artery disease) 07/03/2018    Hypertension 07/03/2018    Vertigo 04/11/2023    Nausea & vomiting 04/11/2023    Lower abdominal pain 04/11/2023    Anemia 10/20/2014    Anxiety 07/28/2015    Crohn's disease 12/03/2012    Fatty liver 10/20/2014    Nonalcoholic steatohepatitis (JACKSON) 12/09/2012    Obstructive sleep apnea syndrome 04/11/2023     Resolved Ambulatory Problems     Diagnosis Date Noted    No Resolved Ambulatory Problems     Past Medical History:   Diagnosis Date    Arthritis     Asthma     Chest pain     Depression     Hyperlipidemia     Migraine     Sleep apnea     Snoring          PAST SURGICAL HISTORY  Past Surgical History:   Procedure Laterality Date    ABDOMINAL SURGERY      CARDIAC CATHETERIZATION      CARDIAC CATHETERIZATION Left 6/13/2018    Procedure: Cardiac Catheterization/Vascular Study;  Surgeon: Sukh Fisher MD;  Location:  [facility];  Service: Cardiovascular    CARDIAC CATHETERIZATION N/A 6/13/2018    Procedure: Left Heart Cath;  Surgeon: Sukh Fisher MD;  Location:  [facility];  Service: Cardiovascular    CARDIAC CATHETERIZATION N/A 6/13/2018    Procedure: Coronary angiography;  Surgeon: Sukh Fisher MD;  Location:  [facility];  Service: Cardiovascular    CARDIAC CATHETERIZATION N/A 6/13/2018    Procedure: Left ventriculography;  Surgeon: Sukh Fisher MD;  Location:  [facility];  Service: Cardiovascular    CHOLECYSTECTOMY      HERNIA REPAIR      HYSTERECTOMY      JOINT REPLACEMENT      KNEE SURGERY           FAMILY HISTORY  Family History   Problem Relation Age of Onset    Hypertension Mother     Atrial fibrillation Mother     Hypertension Father     Diabetes Maternal Grandmother     Diabetes Maternal Grandfather     Diabetes Paternal  Grandmother     Diabetes Paternal Grandfather          SOCIAL HISTORY  Social History     Socioeconomic History    Marital status:    Tobacco Use    Smoking status: Former     Current packs/day: 0.25     Average packs/day: 0.3 packs/day for 1 year (0.3 ttl pk-yrs)     Types: Cigarettes    Smokeless tobacco: Never    Tobacco comments:     caffeine use   Vaping Use    Vaping status: Never Used   Substance and Sexual Activity    Alcohol use: No    Drug use: No    Sexual activity: Defer         ALLERGIES  Latex, Peg [id number] bicarb-nacl, Pravastatin, and Simvastatin        REVIEW OF SYSTEMS  Review of Systems  Included in HPI  All systems reviewed and negative except for those discussed in HPI.      PHYSICAL EXAM    I have reviewed the triage vital signs and nursing notes.    ED Triage Vitals   Temp Heart Rate Resp BP SpO2   06/06/25 1221 06/06/25 1221 06/06/25 1221 06/06/25 1228 06/06/25 1221   98.2 °F (36.8 °C) 106 14 156/86 96 %      Temp src Heart Rate Source Patient Position BP Location FiO2 (%)   06/06/25 1221 06/06/25 1221 06/06/25 1228 -- --   Oral Monitor Sitting         Physical Exam  GENERAL: Cooperative but ill-appearing obese female, conversant, alert, no acute distress  SKIN: Warm, dry, generalized pallor  HENT: Normocephalic, atraumatic  EYES: no scleral icterus  CV: regular rhythm, accelerated rate  RESPIRATORY: normal effort, diminished at the bases with no wheezing and no rhonchi  ABDOMEN: soft, diffuse upper abdominal tenderness to palpation with no rebound and no guarding, obese, nondistended  MUSCULOSKELETAL: no deformity  NEURO: alert, moves all extremities, follows commands                                                                   LAB RESULTS  Recent Results (from the past 24 hours)   Comprehensive Metabolic Panel    Collection Time: 06/06/25  1:22 PM    Specimen: Blood   Result Value Ref Range    Glucose 93 65 - 99 mg/dL    BUN 15.0 8.0 - 23.0 mg/dL    Creatinine 1.47 (H) 0.57 -  1.00 mg/dL    Sodium 140 136 - 145 mmol/L    Potassium 4.3 3.5 - 5.2 mmol/L    Chloride 98 98 - 107 mmol/L    CO2 26.4 22.0 - 29.0 mmol/L    Calcium 10.0 8.6 - 10.5 mg/dL    Total Protein 8.6 (H) 6.0 - 8.5 g/dL    Albumin 4.2 3.5 - 5.2 g/dL    ALT (SGPT) 33 1 - 33 U/L    AST (SGOT) 44 (H) 1 - 32 U/L    Alkaline Phosphatase 133 (H) 39 - 117 U/L    Total Bilirubin 0.9 0.0 - 1.2 mg/dL    Globulin 4.4 gm/dL    A/G Ratio 1.0 g/dL    BUN/Creatinine Ratio 10.2 7.0 - 25.0    Anion Gap 15.6 (H) 5.0 - 15.0 mmol/L    eGFR 39.5 (L) >60.0 mL/min/1.73   Lipase    Collection Time: 06/06/25  1:22 PM    Specimen: Blood   Result Value Ref Range    Lipase 29 13 - 60 U/L   Lactic Acid, Plasma    Collection Time: 06/06/25  1:22 PM    Specimen: Blood   Result Value Ref Range    Lactate 3.4 (C) 0.5 - 2.0 mmol/L   Green Top (Gel)    Collection Time: 06/06/25  1:22 PM   Result Value Ref Range    Extra Tube Hold for add-ons.    Lavender Top    Collection Time: 06/06/25  1:22 PM   Result Value Ref Range    Extra Tube hold for add-on    Gold Top - SST    Collection Time: 06/06/25  1:22 PM   Result Value Ref Range    Extra Tube Hold for add-ons.    Light Blue Top    Collection Time: 06/06/25  1:22 PM   Result Value Ref Range    Extra Tube Hold for add-ons.    CBC Auto Differential    Collection Time: 06/06/25  1:22 PM    Specimen: Blood   Result Value Ref Range    WBC 10.85 (H) 3.40 - 10.80 10*3/mm3    RBC 5.22 3.77 - 5.28 10*6/mm3    Hemoglobin 14.5 12.0 - 15.9 g/dL    Hematocrit 45.8 34.0 - 46.6 %    MCV 87.7 79.0 - 97.0 fL    MCH 27.8 26.6 - 33.0 pg    MCHC 31.7 31.5 - 35.7 g/dL    RDW 13.9 12.3 - 15.4 %    RDW-SD 44.4 37.0 - 54.0 fl    MPV 9.9 6.0 - 12.0 fL    Platelets 249 140 - 450 10*3/mm3    Neutrophil % 93.0 (H) 42.7 - 76.0 %    Lymphocyte % 4.9 (L) 19.6 - 45.3 %    Monocyte % 0.8 (L) 5.0 - 12.0 %    Eosinophil % 0.6 0.3 - 6.2 %    Basophil % 0.3 0.0 - 1.5 %    Immature Grans % 0.4 0.0 - 0.5 %    Neutrophils, Absolute 10.10 (H) 1.70 -  7.00 10*3/mm3    Lymphocytes, Absolute 0.53 (L) 0.70 - 3.10 10*3/mm3    Monocytes, Absolute 0.09 (L) 0.10 - 0.90 10*3/mm3    Eosinophils, Absolute 0.06 0.00 - 0.40 10*3/mm3    Basophils, Absolute 0.03 0.00 - 0.20 10*3/mm3    Immature Grans, Absolute 0.04 0.00 - 0.05 10*3/mm3    nRBC 0.0 0.0 - 0.2 /100 WBC   C-reactive Protein    Collection Time: 06/06/25  1:22 PM    Specimen: Blood   Result Value Ref Range    C-Reactive Protein 0.61 (H) 0.00 - 0.50 mg/dL   STAT Lactic Acid, Reflex    Collection Time: 06/06/25  4:14 PM    Specimen: Arm, Left; Blood   Result Value Ref Range    Lactate 1.0 0.5 - 2.0 mmol/L   Urinalysis With Microscopic If Indicated (No Culture) - Urine, Clean Catch    Collection Time: 06/06/25  5:51 PM    Specimen: Urine, Clean Catch   Result Value Ref Range    Color, UA Yellow Yellow, Straw    Appearance, UA Clear Clear    pH, UA <=5.0 5.0 - 8.0    Specific Gravity, UA >1.030 (H) 1.005 - 1.030    Glucose, UA Negative Negative    Ketones, UA 15 mg/dL (1+) (A) Negative    Bilirubin, UA Negative Negative    Blood, UA Moderate (2+) (A) Negative    Protein, UA Negative Negative    Leuk Esterase, UA Small (1+) (A) Negative    Nitrite, UA Negative Negative    Urobilinogen, UA 0.2 E.U./dL 0.2 - 1.0 E.U./dL   Urinalysis, Microscopic Only - Urine, Clean Catch    Collection Time: 06/06/25  5:51 PM    Specimen: Urine, Clean Catch   Result Value Ref Range    RBC, UA 11-20 (A) None Seen, 0-2 /HPF    WBC, UA 6-10 (A) None Seen, 0-2 /HPF    Bacteria, UA 1+ (A) None Seen /HPF    Squamous Epithelial Cells, UA 13-20 (A) None Seen, 0-2 /HPF    Hyaline Casts, UA 0-2 None Seen /LPF    Methodology Automated Microscopy    Basic Metabolic Panel    Collection Time: 06/07/25  5:30 AM    Specimen: Blood   Result Value Ref Range    Glucose 101 (H) 65 - 99 mg/dL    BUN 15.0 8.0 - 23.0 mg/dL    Creatinine 1.24 (H) 0.57 - 1.00 mg/dL    Sodium 138 136 - 145 mmol/L    Potassium 3.6 3.5 - 5.2 mmol/L    Chloride 105 98 - 107 mmol/L     CO2 23.1 22.0 - 29.0 mmol/L    Calcium 8.2 (L) 8.6 - 10.5 mg/dL    BUN/Creatinine Ratio 12.1 7.0 - 25.0    Anion Gap 9.9 5.0 - 15.0 mmol/L    eGFR 48.4 (L) >60.0 mL/min/1.73   CBC (No Diff)    Collection Time: 06/07/25  5:30 AM    Specimen: Blood   Result Value Ref Range    WBC 9.81 3.40 - 10.80 10*3/mm3    RBC 3.90 3.77 - 5.28 10*6/mm3    Hemoglobin 11.0 (L) 12.0 - 15.9 g/dL    Hematocrit 33.4 (L) 34.0 - 46.6 %    MCV 85.6 79.0 - 97.0 fL    MCH 28.2 26.6 - 33.0 pg    MCHC 32.9 31.5 - 35.7 g/dL    RDW 13.7 12.3 - 15.4 %    RDW-SD 42.8 37.0 - 54.0 fl    MPV 9.5 6.0 - 12.0 fL    Platelets 195 140 - 450 10*3/mm3         RADIOLOGY  CT Abdomen Pelvis With Contrast  Result Date: 6/6/2025  CT ABDOMEN PELVIS W CONTRAST-  INDICATION: Upper abdominal pain, with nausea and diarrhea  COMPARISON: CT abdomen pelvis April 10, 2023  TECHNIQUE: Routine CT abdomen and pelvis with IV contrast. Coronal and sagittal reformats. Radiation dose reduction techniques were utilized, including automated exposure control and exposure modulation based on body size.  FINDINGS:  Lung bases: Subsegmental atelectasis at the bases.  ABDOMEN: Normal liver. Cholecystectomy. No biliary ductal dilatation. Spleen is normal in size. Mild to moderate pancreatic atrophy. No pancreatic ductal dilatation or mass seen. No adrenal nodules. No solid-appearing renal mass or hydronephrosis.  Pelvis: No bladder calculus. Hysterectomy. No adnexal mass. Surgical clips in the pelvis.  Bowel: Small lipoma at the duodenal jejunal flexure, series 2, axial mage 50, measures 1.5 cm. No small bowel obstruction. Rectum is collapsed. Gas and fluid in the colonic lumen. Colon is under distended. Suspect a small amount of colonic wall thickening. Normal appendix. Abdominal wall: Rectus diastases. Periumbilical and pelvic wall scarring.  Retroperitoneum: No retroperitoneal lymphadenopathy. Periportal lymphadenopathy. For example, periportal lymph node, series 2, axial mage 40,  measures 1.1 cm, unchanged. For example, a portal caval lymph node, series 2, axial mage 45, measures 1.3 cm, unchanged.  Vasculature: Patent. No abdominal aortic aneurysm. Mild aortic atherosclerotic calcification.  Osseous structures: Bilateral femoral head osteophyte lipping. Exaggerated lower lumbar lordosis. Bilateral L5 pars defects with slight anterolisthesis of L5 on S1. Lower lumbar facet degenerative arthropathy.       1. Small amount of colonic wall thickening with gas and fluid in the colonic lumen. Suspect colitis and diarrhea. 2. Mild periportal adenopathy is similar to prior and therefore most likely reactive. Finding can be correlated with liver function tests.  This report was finalized on 6/6/2025 3:36 PM by Dr. Leon Delacruz M.D on Workstation: [id number]          MEDICATIONS GIVEN IN ER  Medications   sodium chloride 0.9 % flush 10 mL (has no administration in time range)   acetaminophen (TYLENOL) tablet 650 mg (650 mg Oral Given 6/6/25 1850)     Or   acetaminophen (TYLENOL) 160 MG/5ML oral solution 650 mg ( Oral Not Given:  See Alt 6/6/25 1850)     Or   acetaminophen (TYLENOL) suppository 650 mg ( Rectal Not Given:  See Alt 6/6/25 1850)   ondansetron ODT (ZOFRAN-ODT) disintegrating tablet 4 mg ( Oral Not Given:  See Alt 6/7/25 0312)     Or   ondansetron (ZOFRAN) injection 4 mg (4 mg Intravenous Given 6/7/25 0312)   melatonin tablet 2.5 mg (has no administration in time range)   albuterol (PROVENTIL) nebulizer solution 0.083% 2.5 mg/3mL (has no administration in time range)   amitriptyline (ELAVIL) tablet 25 mg (25 mg Oral Given 6/6/25 2059)   busPIRone (BUSPAR) tablet 15 mg (15 mg Oral Given 6/6/25 2059)   DULoxetine (CYMBALTA) DR capsule 60 mg (60 mg Oral Given 6/6/25 2322)   gabapentin (NEURONTIN) capsule 400 mg (400 mg Oral Given 6/6/25 2059)   HYDROcodone-acetaminophen (NORCO) 7.5-325 MG per tablet 1 tablet (1 tablet Oral Given 6/7/25 0312)   hyoscyamine (LEVSIN) SL tablet 125 mcg (125  mcg Sublingual Not Given 6/7/25 0312)   metoprolol succinate XL (TOPROL-XL) 24 hr tablet 25 mg (0 mg Oral Hold 6/6/25 2057)   sodium chloride 0.9 % infusion (75 mL/hr Intravenous Currently Infusing 6/7/25 0705)   sodium chloride 0.9 % bolus 1,000 mL (0 mL Intravenous Stopped 6/6/25 1427)   ondansetron (ZOFRAN) injection 4 mg (4 mg Intravenous Given 6/6/25 1322)   morphine injection 4 mg (4 mg Intravenous Given 6/6/25 1323)   sodium chloride 0.9 % bolus 1,000 mL (0 mL Intravenous Stopped 6/6/25 1615)   droperidol (INAPSINE) injection 1.25 mg (1.25 mg Intravenous Given 6/6/25 1435)   iopamidol (ISOVUE-300) 61 % injection 100 mL (85 mL Intravenous Given by Other 6/6/25 1444)   morphine injection 4 mg (4 mg Intravenous Given 6/6/25 1555)   prochlorperazine (COMPAZINE) injection 10 mg (10 mg Intravenous Given 6/6/25 2323)         ORDERS PLACED DURING THIS VISIT:  Orders Placed This Encounter   Procedures    Blood Culture - Blood,    Blood Culture - Blood,    Gastrointestinal Panel, PCR - Stool, Per Rectum    Clostridioides difficile Toxin - Stool, Per Rectum    Clostridioides difficile Toxin, PCR - Stool, Per Rectum    CT Abdomen Pelvis With Contrast    [place] Draw    Comprehensive Metabolic Panel    Lipase    Urinalysis With Microscopic If Indicated (No Culture) - Urine, Clean Catch    Lactic Acid, Plasma    CBC Auto Differential    C-reactive Protein    STAT Lactic Acid, Reflex    Basic Metabolic Panel    CBC (No Diff)    Urinalysis, Microscopic Only - Urine, Clean Catch    Diet: Liquid; Clear Liquid; Fluid Consistency: Thin (IDDSI 0)    Undress & Gown    Vital Signs    Up with assistance    Daily Weights    Strict Intake & Output    Oral Care    Place Sequential Compression Device    Maintain Sequential Compression Device    Code Status and Medical Interventions: CPR (Attempt to Resuscitate); Full    [facility] (on-call MD unless specified) Details    Inpatient Gastroenterology Consult    Patient Isolation Contact Spore     Telemetry Scan    Telemetry Scan    Telemetry Scan    Insert Peripheral IV    Initiate Observation Status    CBC & Differential    Green Top (Gel)    Lavender Top    Gold Top - SST    Light Blue Top         OUTPATIENT MEDICATION MANAGEMENT:  Current Facility-Administered Medications Ordered in Epic   Medication Dose Route Frequency Provider Last Rate Last Admin    acetaminophen (TYLENOL) tablet 650 mg  650 mg Oral Q4H PRN Michaelle Oviedo MD   650 mg at 06/06/25 1850    Or    acetaminophen (TYLENOL) 160 MG/5ML oral solution 650 mg  650 mg Oral Q4H PRN Michaelle Oviedo MD        Or    acetaminophen (TYLENOL) suppository 650 mg  650 mg Rectal Q4H PRN Michaelle Oviedo MD        albuterol (PROVENTIL) nebulizer solution 0.083% 2.5 mg/3mL  2.5 mg Nebulization Q6H PRN Michaelle Oviedo MD        amitriptyline (ELAVIL) tablet 25 mg  25 mg Oral Nightly Michaelle Oviedo MD   25 mg at 06/06/25 2059    busPIRone (BUSPAR) tablet 15 mg  15 mg Oral 4x Daily Michaelle Oviedo MD   15 mg at 06/06/25 2059    DULoxetine (CYMBALTA) DR capsule 60 mg  60 mg Oral Daily Michaelle Oviedo MD   60 mg at 06/06/25 2322    gabapentin (NEURONTIN) capsule 400 mg  400 mg Oral 4x Daily Michaelle Oviedo MD   400 mg at 06/06/25 2059    HYDROcodone-acetaminophen (NORCO) 7.5-325 MG per tablet 1 tablet  1 tablet Oral Q6H PRN Micahelle Oviedo MD   1 tablet at 06/07/25 0312    hyoscyamine (LEVSIN) SL tablet 125 mcg  125 mcg Sublingual Q6H PRN Michaelle Oviedo MD        melatonin tablet 2.5 mg  2.5 mg Oral Nightly PRN Michaelle Oviedo MD        metoprolol succinate XL (TOPROL-XL) 24 hr tablet 25 mg  25 mg Oral Nightly Michaelle Oviedo MD        ondansetron ODT (ZOFRAN-ODT) disintegrating tablet 4 mg  4 mg Oral Q6H PRN Michaelle Oviedo MD        Or    ondansetron (ZOFRAN) injection 4 mg  4 mg Intravenous Q6H PRN Stingl, Michaelle Sanz MD   4 mg at 06/07/25 0312    sodium chloride 0.9  % flush 10 mL  10 mL Intravenous Juhi De La Cruz MD        sodium chloride 0.9 % infusion  75 mL/hr Intravenous Continuous Michaelle Oviedo MD 75 mL/hr at 06/07/25 0705 75 mL/hr at 06/07/25 0705     No current [facility]-ordered outpatient medications on file.         PROCEDURES  Procedures            PROGRESS, DATA ANALYSIS, CONSULTS, AND MEDICAL DECISION MAKING  All labs have been independently interpreted by me.  All radiology studies have been reviewed by me. All EKG's have been independently viewed and interpreted by me.  Discussion below represents my analysis of pertinent findings related to patient's condition, differential diagnosis, treatment plan and final disposition.    The differential diagnosis for this patient includes: appendicitis, pancreatitis, cholecystitis/biliary colic, PUD, gastritis, pneumonia, ureteral stone, sbo, hernia, colitis, diverticulitis, AAA, malignancy, gastroenteritis, food intolerances. Abdominal exam is without peritoneal signs. There is no evidence of acute abdomen at this time. Plan serum labs CRP and lactic, urinalysis, pain control/IV antiemetics/IV fluids, IMAGING CT abdomen pelvis, and serial reassessment.      ED Course as of 06/07/25 0829   Fri Jun 06, 2025   1551 The workup and findings with the patient and family at bedside.  Answered all questions.  She has an elevated lactic acid, elevated white blood cell count.  She is having persistent abdominal pain with nausea vomiting diarrhea.  Her CT shows colitis.  Plan admission for further management.  They are agreeable.  Is difficult to tell at this time whether the colitis is from an infectious cause or from her Crohn's.  She is receiving IV fluids and we plan repeat lactic acid.  We are sending GI stool for testing.  She is afebrile.  Plan to hold on antibiotics until we have further information. [TR]   1556 Discussing with Dr. Oviedo with [facility].  She agrees to admit. [TR]      ED Course User Index  [TR]  Benjamin Baron MD             AS OF 08:29 EDT VITALS:    BP - 120/54  HR - 85  TEMP - 98.2 °F (36.8 °C) (Oral)  O2 SATS - 95%      COMPLEXITY OF CARE  The patient requires admission.    DIAGNOSIS  Final diagnoses:   Pain of upper abdomen   Nausea   Diarrhea, unspecified type   YECENIA (acute kidney injury)   Colitis         DISPOSITION  ED Disposition       ED Disposition   Decision to Admit    Condition   --    Comment   Level of Care: Telemetry [5]   Diagnosis: Colitis [385557]   Admitting Physician: MICHAELLE OVIEDO [[id number]]   Attending Physician: MICHAELLE OVIEDO [[id number]]   Is patient appropriate for Inpatient Observation Unit?: Yes [1]                  Please note that portions of this document were completed with a voice recognition program.    Note Disclaimer: At [facility], we believe that sharing information builds trust and better relationships. You are receiving this note because you recently visited [facility]. It is possible you will see health information before a provider has talked with you about it. This kind of information can be easy to misunderstand. To help you fully understand what it means for your health, we urge you to discuss this note with your provider.         Juhi Elmore MD  06/07/25 0830      Electronically signed by Juhi Elmore MD at 06/07/25 0830       Jaelyn Hinojosa, RN at 06/06/25 1220          Patient c/o abd pain with nausea since Tuesday. She reports losing 17 lbs since Tuesday.     Electronically signed by Jaelyn Hinojosa RN at 06/06/25 1221       Current Facility-Administered Medications   Medication Dose Route Frequency Provider Last Rate Last Admin    acetaminophen (TYLENOL) tablet 650 mg  650 mg Oral Q4H PRN Michaelle Oviedo MD   650 mg at 06/08/25 1330    Or    acetaminophen (TYLENOL) 160 MG/5ML oral solution 650 mg  650 mg Oral Q4H PRN Michaelle Oviedo MD        Or    acetaminophen (TYLENOL) suppository 650 mg  650 mg Rectal Q4H PRN  Michaelle Oviedo MD        albuterol (PROVENTIL) nebulizer solution 0.083% 2.5 mg/3mL  2.5 mg Nebulization Q6H PRN Michaelle Oviedo MD        amitriptyline (ELAVIL) tablet 25 mg  25 mg Oral Nightly Michaelle Oviedo MD   25 mg at 06/08/25 2006    busPIRone (BUSPAR) tablet 15 mg  15 mg Oral 4x Daily Michaelle Oviedo MD   15 mg at 06/09/25 1128    DULoxetine (CYMBALTA) DR capsule 60 mg  60 mg Oral Daily Michaelle Oviedo MD   60 mg at 06/08/25 2010    gabapentin (NEURONTIN) capsule 400 mg  400 mg Oral 4x Daily Michaelle Oviedo MD   400 mg at 06/09/25 1128    HYDROcodone-acetaminophen (NORCO) 7.5-325 MG per tablet 1 tablet  1 tablet Oral Q6H PRN Michaelle Oviedo MD   1 tablet at 06/09/25 0812    hyoscyamine (LEVSIN) SL tablet 125 mcg  125 mcg Sublingual Q6H PRN Michaelle Oviedo MD        melatonin tablet 2.5 mg  2.5 mg Oral Nightly PRN Michaelle Oviedo MD        metoprolol succinate XL (TOPROL-XL) 24 hr tablet 25 mg  25 mg Oral Nightly Michaelle Oviedo MD   25 mg at 06/08/25 2007    ondansetron ODT (ZOFRAN-ODT) disintegrating tablet 4 mg  4 mg Oral Q6H PRN Michaelle Oviedo MD   4 mg at 06/08/25 2045    Or    ondansetron (ZOFRAN) injection 4 mg  4 mg Intravenous Q6H PRN Michaelle Oviedo MD   4 mg at 06/08/25 1439    promethazine (PHENERGAN) tablet 12.5 mg  12.5 mg Oral Q6H PRN Odessa Roman MD   12.5 mg at 06/08/25 0423    Or    promethazine (PHENERGAN) suppository 12.5 mg  12.5 mg Rectal Q6H PRN Odessa Roman MD        scopolamine patch 1 mg/72 hr  1 patch Transdermal Q72H Jesse Gonzales MD   1 patch at 06/08/25 0813    sodium chloride 0.9 % flush 10 mL  10 mL Intravenous PRN Juhi Elmore MD            Physician Progress Notes (last 48 hours)        Chey Justice PA-C at 06/09/25 0743          [facility]  Inpatient Progress Note    Reason for Follow Up:  Abdominal pain     Subjective     Interval History:    Reports she has been feeling really well today.  Tolerating clear liquid diet and hopeful to advance to something more solid.  She is having some abdominal discomfort but states it has overall improved.  No diarrhea or black or bloody stool.    Current Facility-Administered Medications:     acetaminophen (TYLENOL) tablet 650 mg, 650 mg, Oral, Q4H PRN, 650 mg at 06/08/25 1330 **OR** acetaminophen (TYLENOL) 160 MG/5ML oral solution 650 mg, 650 mg, Oral, Q4H PRN **OR** acetaminophen (TYLENOL) suppository 650 mg, 650 mg, Rectal, Q4H PRN, Michaelle Oviedo MD    albuterol (PROVENTIL) nebulizer solution 0.083% 2.5 mg/3mL, 2.5 mg, Nebulization, Q6H PRN, Michaelle Oviedo MD    amitriptyline (ELAVIL) tablet 25 mg, 25 mg, Oral, Nightly, Michaelle Oviedo MD, 25 mg at 06/08/25 2006    busPIRone (BUSPAR) tablet 15 mg, 15 mg, Oral, 4x Daily, Michaelle Oviedo MD, 15 mg at 06/08/25 2006    DULoxetine (CYMBALTA) DR capsule 60 mg, 60 mg, Oral, Daily, Michaelle Oviedo MD, 60 mg at 06/08/25 2010    gabapentin (NEURONTIN) capsule 400 mg, 400 mg, Oral, 4x Daily, Michaelle Oviedo MD, 400 mg at 06/08/25 2007    HYDROcodone-acetaminophen (NORCO) 7.5-325 MG per tablet 1 tablet, 1 tablet, Oral, Q6H PRN, Michaelle Oviedo MD, 1 tablet at 06/09/25 0252    hyoscyamine (LEVSIN) SL tablet 125 mcg, 125 mcg, Sublingual, Q6H PRN, Michaelle Oviedo MD    melatonin tablet 2.5 mg, 2.5 mg, Oral, Nightly PRN, Michaelle Oviedo MD    metoprolol succinate XL (TOPROL-XL) 24 hr tablet 25 mg, 25 mg, Oral, Nightly, Michaelle Oviedo MD, 25 mg at 06/08/25 2007    ondansetron ODT (ZOFRAN-ODT) disintegrating tablet 4 mg, 4 mg, Oral, Q6H PRN, 4 mg at 06/08/25 2045 **OR** ondansetron (ZOFRAN) injection 4 mg, 4 mg, Intravenous, Q6H PRN, Stingl, Michaelle Sanz MD, 4 mg at 06/08/25 1439    promethazine (PHENERGAN) tablet 12.5 mg, 12.5 mg, Oral, Q6H PRN, 12.5 mg at 06/08/25 0423 **OR** promethazine (PHENERGAN)  suppository 12.5 mg, 12.5 mg, Rectal, Q6H PRN, Odessa Roman MD    scopolamine patch 1 mg/72 hr, 1 patch, Transdermal, Q72H, Jesse Gonzales MD, 1 patch at 06/08/25 0813    sodium chloride 0.9 % flush 10 mL, 10 mL, Intravenous, PRN, Juhi Elmore MD    sodium chloride 0.9 % infusion, 75 mL/hr, Intravenous, Continuous, Odessa Roman MD, Last Rate: 75 mL/hr at 06/09/25 0254, 75 mL/hr at 06/09/25 0254    Objective     Vital Signs  Temp:  [97.5 °F (36.4 °C)-97.9 °F (36.6 °C)] 97.9 °F (36.6 °C)  Heart Rate:  [61-73] 62  Resp:  [16-18] 16  BP: (129-138)/(58-67) 132/65  Body mass index is 43.59 kg/m².    Intake/Output Summary (Last 24 hours) at 6/9/2025 0743  Last data filed at 6/9/2025 0254  Gross per 24 hour   Intake 670 ml   Output --   Net 670 ml     No intake/output data recorded.     Physical Exam:   General: patient awake, alert and cooperative   Eyes: Normal lids and lashes, no scleral icterus   Skin: warm and dry, not jaundiced   Pulm: regular and unlabored   Abdomen: soft, nontender, nondistended;     Results Review:     I reviewed the patient's new clinical results.    Results from last 7 days   Lab Units 06/09/25  0539 06/08/25 0731 06/07/25  0530   WBC 10*3/mm3 7.93 7.73 9.81   HEMOGLOBIN g/dL 10.9* 11.1* 11.0*   HEMATOCRIT % 33.0* 33.8* 33.4*   PLATELETS 10*3/mm3 178 127* 195     Results from last 7 days   Lab Units 06/09/25  0539 06/08/25  0731 06/07/25  0530 06/06/25  1322   SODIUM mmol/L 139 137 138 140   POTASSIUM mmol/L 4.0 4.7 3.6 4.3   CHLORIDE mmol/L 105 105 105 98   CO2 mmol/L 23.0 20.3* 23.1 26.4   BUN mg/dL 9.0 11.0 15.0 15.0   CREATININE mg/dL 0.98 1.29* 1.24* 1.47*   CALCIUM mg/dL 8.2* 8.3* 8.2* 10.0   BILIRUBIN mg/dL 0.4 0.4  --  0.9   ALK PHOS U/L 93 93  --  133*   ALT (SGPT) U/L 20 23  --  33   AST (SGOT) U/L 30 34*  --  44*   GLUCOSE mg/dL 75 77 101* 93         Lab Results   Lab Value Date/Time    LIPASE 29 06/06/2025 1322    LIPASE 19 04/10/2023 1754    LIPASE 125 (H) 07/10/2015  1746       Radiology:  CT Abdomen Pelvis With Contrast   Final Result       1. Small amount of colonic wall thickening with gas and fluid in the   colonic lumen. Suspect colitis and diarrhea.   2. Mild periportal adenopathy is similar to prior and therefore most   likely reactive. Finding can be correlated with liver function tests.       This report was finalized on 2025 3:36 PM by Dr. Leon Delacruz M.D   on Workstation: [id number]              Assessment & Plan     Active Hospital Problems    Diagnosis     **Colitis     Nausea & vomiting     Hypertension     Crohn's disease        Assessment:  Nausea/vomiting/diarrhea  Abdominal pain   History of Crohns     All problems are new to me today    Plan:  Stool studies negative.  Fecal calprotectin is still pending.  Sed rate is elevated but her symptoms are overall improving.  She is tolerating clear liquid diet well.  She did have some colonic wall thickening on her CT scan so she would benefit from a colonoscopy in several weeks-this could be done outpatient - will send a message to get this scheduled.   Ok to advance diet as tolerated from GI standpoint    At this time, GI will sign off. Please do not hesitate to call back if needed.     Patient and plan of care discussed w/ attending, Dr. Yennifer Justice   [facility]  [phone]      Electronically signed by Chey Justice PA-C at 25 1206       Odessa Roman MD at 25 1118              Name: Rae Hyde ADMIT: 2025   : 1960  PCP: Rolando Elena MD    MRN: [id number] LOS: 0 days   AGE/SEX: 64 y.o. female  ROOM: [facility]     Subjective   Subjective   No acute events overnight.  Patient had worsening of her nausea overnight.  She has not had any vomiting but has had pretty persistent nausea.  She denies chest pain or shortness of breath.  She has been afebrile.  She also states that she has a migraine this morning which  she frequently gets at home.    Objective   Objective     Vital Signs  Temp:  [98.2 °F (36.8 °C)-99.5 °F (37.5 °C)] 99.5 °F (37.5 °C)  Heart Rate:  [75-78] 78  Resp:  [17-18] 18  BP: (122-130)/(51-60) 130/60  SpO2:  [94 %-100 %] 100 %  on  Flow (L/min) (Oxygen Therapy):  [2] 2;   Device (Oxygen Therapy): nasal cannula  Body mass index is 43.44 kg/m².    Physical Exam  General: Alert, no acute distress.  Lying in bed.  Chronically ill-appearing.  ENT: No conjunctival injection or scleral icterus. Moist mucous membranes.  Neuro: Eyes open and moving in all directions, generalized weakness, face symmetric, no focal deficits.   Lungs: Clear to auscultation bilaterally. No wheeze or crackles. No distress.   Heart: RRR, no murmurs. No edema.  Abdomen: Soft, normal bowel sounds.  Mildly distended, generalized tenderness to palpation.  Ext: Warm and well-perfused. No edema.   Skin: No rashes or lesions. IV site without swelling or erythema.     Results Review     I reviewed the patient's new clinical results:  Results from last 7 days   Lab Units 06/08/25  0731 06/07/25  0530 06/06/25  1322   WBC 10*3/mm3 7.73 9.81 10.85*   HEMOGLOBIN g/dL 11.1* 11.0* 14.5   PLATELETS 10*3/mm3 127* 195 249     Results from last 7 days   Lab Units 06/08/25  0731 06/07/25  0530 06/06/25  1322   SODIUM mmol/L 137 138 140   POTASSIUM mmol/L 4.7 3.6 4.3   CHLORIDE mmol/L 105 105 98   CO2 mmol/L 20.3* 23.1 26.4   BUN mg/dL 11.0 15.0 15.0   CREATININE mg/dL 1.29* 1.24* 1.47*   GLUCOSE mg/dL 77 101* 93   EGFR mL/min/1.73 46.2* 48.4* 39.5*     Results from last 7 days   Lab Units 06/08/25  0731 06/06/25  1322   ALBUMIN g/dL 3.3* 4.2   BILIRUBIN mg/dL 0.4 0.9   ALK PHOS U/L 93 133*   AST (SGOT) U/L 34* 44*   ALT (SGPT) U/L 23 33     Results from last 7 days   Lab Units 06/08/25  0731 06/07/25  0530 06/06/25  1322   CALCIUM mg/dL 8.3* 8.2* 10.0   ALBUMIN g/dL 3.3*  --  4.2     Results from last 7 days   Lab Units 06/06/25  1614 06/06/25  1322  "  LACTATE mmol/L 1.0 3.4*     No results found for: \"HGBA1C\", \"POCGLU\"    CT Abdomen Pelvis With Contrast  Result Date: 6/6/2025   1. Small amount of colonic wall thickening with gas and fluid in the colonic lumen. Suspect colitis and diarrhea. 2. Mild periportal adenopathy is similar to prior and therefore most likely reactive. Finding can be correlated with liver function tests.  This report was finalized on 6/6/2025 3:36 PM by Dr. Leon Delacruz M.D on Workstation: [id number]        I have personally reviewed all medications:  Scheduled Medications  amitriptyline, 25 mg, Oral, Nightly  busPIRone, 15 mg, Oral, 4x Daily  DULoxetine, 60 mg, Oral, Daily  gabapentin, 400 mg, Oral, 4x Daily  metoprolol succinate XL, 25 mg, Oral, Nightly  Scopolamine, 1 patch, Transdermal, Q72H    Infusions  sodium chloride, 75 mL/hr, Last Rate: 75 mL/hr (06/08/25 0943)    Diet  Diet: Liquid; Clear Liquid; Fluid Consistency: Thin (IDDSI 0)      Intake/Output Summary (Last 24 hours) at 6/8/2025 1128  Last data filed at 6/8/2025 0813  Gross per 24 hour   Intake 580 ml   Output --   Net 580 ml       Assessment/Plan     Active Hospital Problems    Diagnosis  POA    **Colitis [K52.9]  Yes    Nausea & vomiting [R11.2]  Yes    Hypertension [I10]  Yes    Crohn's disease [K50.90]  Yes      Resolved Hospital Problems   No resolved problems to display.     64 y.o. female with Colitis.    Colitis  Crohn's disease  Nausea  -CT abdomen/pelvis with small amount of colonic wall thickening with gas and fluid in the colonic lumen  -Leukocytosis on admission, WBC is now normalized.  Lactic acid normalized as well.  -GI PCR and C. difficile negative  -Blood culture pending, no growth to date  - GI consulted and following  - Continue clear liquid diet  - Continues to have nausea as well as migraine this morning.  Will trial IV Compazine and Benadryl and continue fluids.  Hold off on Toradol portion of migraine cocktail given YECENIA.  - GI has added " scopolamine patch    CKD  - Creatinine fairly consistent with baseline this morning at 1.29  - Continue IV fluids while p.o. intake is poor  - Avoid nephrotoxic agents including NSAIDs and contrast dyes  - Repeat BMP with morning labs    Hypertension  -BP trend acceptable  - Continue home metoprolol    Elevated transaminase  - ALT elevated on admission, improved this morning  - Repeat CMP with morning labs  -Further workup if LFTs continue to increase    Mood disorder  -Continue home meds    JULIANA  - Wears CPAP at home, okay for hospital CPAP if available  - Oxygen supplementation as needed    SCDs for DVT prophylaxis.  Full code.  Discussed with patient.  Anticipate discharge home in 1-2 days.      Odessa Roman MD  [facility]  06/08/25  11:28 EDT      Electronically signed by Odessa Roman MD at 06/08/25 1129       Jesse Gonzales MD at 06/08/25 0638          [facility]  Inpatient Progress Note    Reason for Follow Up: Abdominal pain    Subjective     Interval History:   Patient reports feeling a lot better today.  No new complaints.    Current Facility-Administered Medications:     acetaminophen (TYLENOL) tablet 650 mg, 650 mg, Oral, Q4H PRN, 650 mg at 06/06/25 1850 **OR** acetaminophen (TYLENOL) 160 MG/5ML oral solution 650 mg, 650 mg, Oral, Q4H PRN **OR** acetaminophen (TYLENOL) suppository 650 mg, 650 mg, Rectal, Q4H PRN, Michaelle Oviedo MD    albuterol (PROVENTIL) nebulizer solution 0.083% 2.5 mg/3mL, 2.5 mg, Nebulization, Q6H PRN, Michaelle Oviedo MD    amitriptyline (ELAVIL) tablet 25 mg, 25 mg, Oral, Nightly, Michaelle Oviedo MD, 25 mg at 06/07/25 2141    busPIRone (BUSPAR) tablet 15 mg, 15 mg, Oral, 4x Daily, Michaelle Oviedo MD, 15 mg at 06/07/25 2144    DULoxetine (CYMBALTA) DR capsule 60 mg, 60 mg, Oral, Daily, Michaelle Oviedo MD, 60 mg at 06/07/25 2144    gabapentin (NEURONTIN) capsule 400 mg, 400 mg, Oral, 4x Daily,  Michaelle Oviedo MD, 400 mg at 06/07/25 2144    HYDROcodone-acetaminophen (NORCO) 7.5-325 MG per tablet 1 tablet, 1 tablet, Oral, Q6H PRN, Michaelle Oviedo MD, 1 tablet at 06/08/25 0013    hyoscyamine (LEVSIN) SL tablet 125 mcg, 125 mcg, Sublingual, Q6H PRN, Michaelle Oviedo MD    melatonin tablet 2.5 mg, 2.5 mg, Oral, Nightly PRN, Michaelle Oviedo MD    metoprolol succinate XL (TOPROL-XL) 24 hr tablet 25 mg, 25 mg, Oral, Nightly, Michaelle Oviedo MD, 25 mg at 06/07/25 2144    ondansetron ODT (ZOFRAN-ODT) disintegrating tablet 4 mg, 4 mg, Oral, Q6H PRN, 4 mg at 06/08/25 0612 **OR** ondansetron (ZOFRAN) injection 4 mg, 4 mg, Intravenous, Q6H PRN, Michaelle Oviedo MD, 4 mg at 06/08/25 0007    promethazine (PHENERGAN) tablet 12.5 mg, 12.5 mg, Oral, Q6H PRN, 12.5 mg at 06/08/25 0423 **OR** promethazine (PHENERGAN) suppository 12.5 mg, 12.5 mg, Rectal, Q6H PRN, Odessa Roman MD    scopolamine patch 1 mg/72 hr, 1 patch, Transdermal, Q72H, Jesse Gonzales MD    sodium chloride 0.9 % flush 10 mL, 10 mL, Intravenous, PRN, Juhi Elmore MD    sodium chloride 0.9 % infusion, 75 mL/hr, Intravenous, Continuous, Odessa Roman MD, Last Rate: 75 mL/hr at 06/08/25 0220, 75 mL/hr at 06/08/25 0220  Review of Systems:    The following systems were reviewed and negative;  gastrointestinal    Objective     Vital Signs  Temp:  [98.2 °F (36.8 °C)-99.1 °F (37.3 °C)] 98.7 °F (37.1 °C)  Heart Rate:  [75-85] 78  Resp:  [17-18] 18  BP: (116-130)/(51-61) 130/58  Body mass index is 43.67 kg/m².    Intake/Output Summary (Last 24 hours) at 6/8/2025 0638  Last data filed at 6/7/2025 1613  Gross per 24 hour   Intake 720 ml   Output --   Net 720 ml     No intake/output data recorded.     Physical Exam:   General: patient awake, alert and cooperative   Eyes: Normal lids and lashes, no scleral icterus   Neck: supple, normal ROM   Skin: warm and dry, not jaundiced   Cardiovascular: regular rhythm and rate, no  murmurs auscultated   Pulm: clear to auscultation bilaterally, regular and unlabored   Abdomen: soft, nontender, nondistended; normal bowel sounds   Extremities: no rash or edema   Psychiatric: Normal mood and behavior; memory intact     Results Review:     I reviewed the patient's new clinical results.    Results from last 7 days   Lab Units 06/07/25  0530 06/06/25  1322   WBC 10*3/mm3 9.81 10.85*   HEMOGLOBIN g/dL 11.0* 14.5   HEMATOCRIT % 33.4* 45.8   PLATELETS 10*3/mm3 195 249     Results from last 7 days   Lab Units 06/07/25  0530 06/06/25  1322   SODIUM mmol/L 138 140   POTASSIUM mmol/L 3.6 4.3   CHLORIDE mmol/L 105 98   CO2 mmol/L 23.1 26.4   BUN mg/dL 15.0 15.0   CREATININE mg/dL 1.24* 1.47*   CALCIUM mg/dL 8.2* 10.0   BILIRUBIN mg/dL  --  0.9   ALK PHOS U/L  --  133*   ALT (SGPT) U/L  --  33   AST (SGOT) U/L  --  44*   GLUCOSE mg/dL 101* 93         Lab Results   Lab Value Date/Time    LIPASE 29 06/06/2025 1322    LIPASE 19 04/10/2023 1754    LIPASE 125 (H) 07/10/2015 1746       Radiology:  CT Abdomen Pelvis With Contrast   Final Result       1. Small amount of colonic wall thickening with gas and fluid in the   colonic lumen. Suspect colitis and diarrhea.   2. Mild periportal adenopathy is similar to prior and therefore most   likely reactive. Finding can be correlated with liver function tests.       This report was finalized on 6/6/2025 3:36 PM by Dr. Leon Delacruz M.D   on Workstation: [id number]              Assessment & Plan     Active Hospital Problems    Diagnosis     **Colitis        Assessment:  N/V/Abdominal pain/Diarrhea  -Hx of Crohns              -Check stool studies for viral GI pathogen-negative              -Check stool culture-negative     2. Hx of Crohns              -Check ESR              -CT with non specific colitis              - Not on biologic.  Had a colonoscopy that she reports was normal and is currently in remission.  When she had the colonoscopy she was on a biologic she  reports.     3. Nausea   -Add scop patch     Plan: Stool studies negative, pending fecal calprotectin and ESR. If these are consistent with crohns flare will start steroids. Other differential is food poisoning, and dyspepsia symptoms. If not improving in the next few days would start steroids.     - Patient is feeling better would hold off on steroids do not think this is a Crohn's flare.  Likely home tomorrow if still feeling better.  He appears to be eating and drinking without issues.        I discussed the patient's findings and my recommendations with patient.    Jesse Gonzales MD                  Electronically signed by Jesse Gonzales MD at 06/08/25 4009

## 2025-06-10 LAB
ALBUMIN SERPL-MCNC: 3.4 G/DL (ref 3.5–5.2)
ALBUMIN/GLOB SERPL: 1.1 G/DL
ALP SERPL-CCNC: 97 U/L (ref 39–117)
ALT SERPL W P-5'-P-CCNC: 20 U/L (ref 1–33)
ANION GAP SERPL CALCULATED.3IONS-SCNC: 9.8 MMOL/L (ref 5–15)
AST SERPL-CCNC: 24 U/L (ref 1–32)
BILIRUB SERPL-MCNC: 0.4 MG/DL (ref 0–1.2)
BUN SERPL-MCNC: 8 MG/DL (ref 8–23)
BUN/CREAT SERPL: 8.2 (ref 7–25)
CALCIUM SPEC-SCNC: 8.7 MG/DL (ref 8.6–10.5)
CHLORIDE SERPL-SCNC: 103 MMOL/L (ref 98–107)
CO2 SERPL-SCNC: 26.2 MMOL/L (ref 22–29)
CREAT SERPL-MCNC: 0.97 MG/DL (ref 0.57–1)
DEPRECATED RDW RBC AUTO: 44.6 FL (ref 37–54)
EGFRCR SERPLBLD CKD-EPI 2021: 65 ML/MIN/1.73
ERYTHROCYTE [DISTWIDTH] IN BLOOD BY AUTOMATED COUNT: 14 % (ref 12.3–15.4)
GLOBULIN UR ELPH-MCNC: 3.2 GM/DL
GLUCOSE SERPL-MCNC: 81 MG/DL (ref 65–99)
HCT VFR BLD AUTO: 33.6 % (ref 34–46.6)
HGB BLD-MCNC: 11.2 G/DL (ref 12–15.9)
MCH RBC QN AUTO: 28.7 PG (ref 26.6–33)
MCHC RBC AUTO-ENTMCNC: 33.3 G/DL (ref 31.5–35.7)
MCV RBC AUTO: 86.2 FL (ref 79–97)
PLATELET # BLD AUTO: 172 10*3/MM3 (ref 140–450)
PMV BLD AUTO: 9.6 FL (ref 6–12)
POTASSIUM SERPL-SCNC: 3.8 MMOL/L (ref 3.5–5.2)
PROT SERPL-MCNC: 6.6 G/DL (ref 6–8.5)
RBC # BLD AUTO: 3.9 10*6/MM3 (ref 3.77–5.28)
SODIUM SERPL-SCNC: 139 MMOL/L (ref 136–145)
WBC NRBC COR # BLD AUTO: 6.72 10*3/MM3 (ref 3.4–10.8)

## 2025-06-10 PROCEDURE — 85027 COMPLETE CBC AUTOMATED: CPT | Performed by: STUDENT IN AN ORGANIZED HEALTH CARE EDUCATION/TRAINING PROGRAM

## 2025-06-10 PROCEDURE — 63710000001 PROMETHAZINE PER 12.5 MG: Performed by: STUDENT IN AN ORGANIZED HEALTH CARE EDUCATION/TRAINING PROGRAM

## 2025-06-10 PROCEDURE — 63710000001 ONDANSETRON ODT 4 MG TABLET DISPERSIBLE: Performed by: HOSPITALIST

## 2025-06-10 PROCEDURE — 80053 COMPREHEN METABOLIC PANEL: CPT | Performed by: STUDENT IN AN ORGANIZED HEALTH CARE EDUCATION/TRAINING PROGRAM

## 2025-06-10 PROCEDURE — 25010000002 ONDANSETRON PER 1 MG: Performed by: INTERNAL MEDICINE

## 2025-06-10 RX ORDER — ONDANSETRON 4 MG/1
4 TABLET, ORALLY DISINTEGRATING ORAL EVERY 8 HOURS
Status: DISCONTINUED | OUTPATIENT
Start: 2025-06-10 | End: 2025-06-11 | Stop reason: HOSPADM

## 2025-06-10 RX ADMIN — HYDROCODONE BITARTRATE AND ACETAMINOPHEN 1 TABLET: 7.5; 325 TABLET ORAL at 15:04

## 2025-06-10 RX ADMIN — GABAPENTIN 400 MG: 400 CAPSULE ORAL at 08:25

## 2025-06-10 RX ADMIN — ONDANSETRON 4 MG: 4 TABLET, ORALLY DISINTEGRATING ORAL at 17:48

## 2025-06-10 RX ADMIN — ONDANSETRON 4 MG: 4 TABLET, ORALLY DISINTEGRATING ORAL at 11:19

## 2025-06-10 RX ADMIN — BUSPIRONE HYDROCHLORIDE 15 MG: 15 TABLET ORAL at 20:49

## 2025-06-10 RX ADMIN — DULOXETINE 60 MG: 60 CAPSULE, DELAYED RELEASE ORAL at 20:49

## 2025-06-10 RX ADMIN — ONDANSETRON 4 MG: 2 INJECTION, SOLUTION INTRAMUSCULAR; INTRAVENOUS at 08:25

## 2025-06-10 RX ADMIN — HYDROCODONE BITARTRATE AND ACETAMINOPHEN 1 TABLET: 7.5; 325 TABLET ORAL at 20:56

## 2025-06-10 RX ADMIN — AMITRIPTYLINE HYDROCHLORIDE 25 MG: 50 TABLET, FILM COATED ORAL at 20:49

## 2025-06-10 RX ADMIN — BUSPIRONE HYDROCHLORIDE 15 MG: 15 TABLET ORAL at 17:48

## 2025-06-10 RX ADMIN — GABAPENTIN 400 MG: 400 CAPSULE ORAL at 11:18

## 2025-06-10 RX ADMIN — GABAPENTIN 400 MG: 400 CAPSULE ORAL at 20:49

## 2025-06-10 RX ADMIN — METOPROLOL SUCCINATE 25 MG: 25 TABLET, EXTENDED RELEASE ORAL at 20:49

## 2025-06-10 RX ADMIN — BUSPIRONE HYDROCHLORIDE 15 MG: 15 TABLET ORAL at 08:25

## 2025-06-10 RX ADMIN — PROMETHAZINE HYDROCHLORIDE 12.5 MG: 12.5 TABLET ORAL at 01:40

## 2025-06-10 RX ADMIN — GABAPENTIN 400 MG: 400 CAPSULE ORAL at 17:48

## 2025-06-10 RX ADMIN — BUSPIRONE HYDROCHLORIDE 15 MG: 15 TABLET ORAL at 11:18

## 2025-06-10 RX ADMIN — HYDROCODONE BITARTRATE AND ACETAMINOPHEN 1 TABLET: 7.5; 325 TABLET ORAL at 04:05

## 2025-06-10 NOTE — PROGRESS NOTES
Name: Rae Hyde ADMIT: 2025   : 1960  PCP: Rolando Elena MD    MRN: [id number] LOS: 2 days   AGE/SEX: 64 y.o. female  ROOM: [facility]     Subjective   Subjective   She continues to feel pretty nauseated when she tries to eat       Objective   Objective   Vital Signs  Temp:  [97.3 °F (36.3 °C)-98.2 °F (36.8 °C)] 98.1 °F (36.7 °C)  Heart Rate:  [53-62] 62  Resp:  [16] 16  BP: (132-143)/(58-70) 143/70  SpO2:  [90 %-95 %] 94 %  on  Flow (L/min) (Oxygen Therapy):  [2] 2;   Device (Oxygen Therapy): nasal cannula  Body mass index is 43.59 kg/m².  Physical Exam  Vitals reviewed.   Constitutional:       General: She is not in acute distress.     Appearance: She is obese.   Cardiovascular:      Rate and Rhythm: Normal rate and regular rhythm.   Pulmonary:      Effort: No respiratory distress.      Breath sounds: Normal breath sounds. No wheezing.   Abdominal:      Palpations: Abdomen is soft.      Tenderness: There is abdominal tenderness.   Musculoskeletal:      Right lower leg: No edema.      Left lower leg: No edema.   Skin:     General: Skin is warm and dry.   Neurological:      Mental Status: She is alert and oriented to person, place, and time.   Psychiatric:         Mood and Affect: Mood normal.       Results Review     I reviewed the patient's new clinical results.  Results from last 7 days   Lab Units 06/10/25  0500 25  0539 25  0731 25  0530   WBC 10*3/mm3 6.72 7.93 7.73 9.81   HEMOGLOBIN g/dL 11.2* 10.9* 11.1* 11.0*   PLATELETS 10*3/mm3 172 178 127* 195     Results from last 7 days   Lab Units 06/10/25  0500 25  0539 25  0731 25  0530   SODIUM mmol/L 139 139 137 138   POTASSIUM mmol/L 3.8 4.0 4.7 3.6   CHLORIDE mmol/L 103 105 105 105   CO2 mmol/L 26.2 23.0 20.3* 23.1   BUN mg/dL 8.0 9.0 11.0 15.0   CREATININE mg/dL 0.97 0.98 1.29* 1.24*   GLUCOSE mg/dL 81 75 77 101*   EGFR mL/min/1.73 65.0 64.2 46.2* 48.4*     Results from last 7 days   Lab Units  "06/10/25  0500 06/09/25  0539 06/08/25  0731 06/06/25  1322   ALBUMIN g/dL 3.4* 3.1* 3.3* 4.2   BILIRUBIN mg/dL 0.4 0.4 0.4 0.9   ALK PHOS U/L 97 93 93 133*   AST (SGOT) U/L 24 30 34* 44*   ALT (SGPT) U/L 20 20 23 33     Results from last 7 days   Lab Units 06/10/25  0500 06/09/25  0539 06/08/25  0731 06/07/25  0530 06/06/25  1322   CALCIUM mg/dL 8.7 8.2* 8.3* 8.2* 10.0   ALBUMIN g/dL 3.4* 3.1* 3.3*  --  4.2     Results from last 7 days   Lab Units 06/06/25  1614 06/06/25  1322   LACTATE mmol/L 1.0 3.4*     No results found for: \"HGBA1C\", \"POCGLU\"    No radiology results for the last day    I have personally reviewed all medications:  Scheduled Medications  amitriptyline, 25 mg, Oral, Nightly  busPIRone, 15 mg, Oral, 4x Daily  DULoxetine, 60 mg, Oral, Daily  gabapentin, 400 mg, Oral, 4x Daily  metoprolol succinate XL, 25 mg, Oral, Nightly  ondansetron ODT, 4 mg, Translingual, Q8H    Infusions   Diet  Diet: Gastrointestinal; Fiber-Restricted; Texture: Soft to Chew (NDD 3); Soft to Chew: Whole Meat; Fluid Consistency: Thin (IDDSI 0)    I have personally reviewed:  [x]  Laboratory   []  Microbiology   []  Radiology   []  EKG/Telemetry  []  Cardiology/Vascular   []  Pathology    []  Records       Assessment/Plan     Active Hospital Problems    Diagnosis  POA    **Colitis [K52.9]  Yes    Hypertension [I10]  Yes    Crohn's disease [K50.90]  Yes      Resolved Hospital Problems    Diagnosis Date Resolved POA    Nausea & vomiting [R11.2] 06/09/2025 Yes       64 y.o. female with history of Crohn's admitted with Colitis.    Not clear if colitis related to Crohn's versus infectious pathogen although GI PCR and C. difficile negative.  Attempted to advance diet and discharge but she has not done well with that thus far.  - Schedule Zofran.  Will stop scopolamine since it does not seem to be helping   - GI signed off  - Fecal calprotectin not obtained.  ESR slightly elevated at 40 although not severely high at her " age    Hypertension stable    Renal function at or below usual baseline    JULIANA: Does not have CPAP or but using oxygen nightly      DVT prophy: SCDs  Disposition: Possible DC tomorrow if she is doing better  Discussed with RN and staff on MDR    Raman Sharp MD  [facility]  06/10/25  16:10 EDT

## 2025-06-10 NOTE — PAYOR COMM NOTE
"Rae Hyde (64 y.o. Female)     PLEASE SEE ATTACHED FOR CONTINUED INPT STAY DAYS    REF #     [id number]    PLEASE CALL REBECCA BRANDT RN/ DEPT @ [phone]   OR [phone]    THANK YOU  REBECCA BRANDT RN  [facility]        Date of Birth   1960    Social Security Number       Address   [address] [place], [place] [address] [address]    Home Phone   [phone]    MRN   [id number]       [employer]   [employer]    Marital Status                               Admission Date   6/6/2025    Admission Type   Emergency    Admitting Provider   Michaelle Oviedo MD    Attending Provider   Raman Sharp MD    Department, Room/Bed   [address], S509/1       Discharge Date       Discharge Disposition       Discharge Destination                                 Attending Provider: Raman Sharp MD    Allergies: Latex, Peg [id number] Bicarb-nacl, Pravastatin, Simvastatin    Isolation: None   Infection: None   Code Status: CPR    Ht: 162.6 cm (64\")   Wt: 115 kg (253 lb 15.5 oz)    Admission Cmt: None   Principal Problem: Colitis [K52.9]                   Active Insurance as of 6/6/2025       Primary Coverage       Payor Plan Insurance Group Employer/Plan Group    [facility] [employer] PPO [id number]       Payor Plan Address Payor Plan Phone Number Payor Plan Fax Number Effective Dates    [address] [phone]  6/1/2018 - None Entered    [facility] [id number]         Subscriber Name Subscriber Birth Date Member ID       RAE HYDE 1960 [id number]                     Emergency Contacts        (Rel.) Home Phone Work Phone Mobile Phone    Elan Hyde (Spouse) -- -- [phone]    Kerry Hyde (Daughter) -- -- [phone]    May,Tammy (Daughter) -- -- --    Mary Shahid (Daughter) -- -- --              [place]: [facility] [id number]  Tax ID [id number]  Current Facility-Administered Medications "   Medication Dose Route Frequency Provider Last Rate Last Admin    acetaminophen (TYLENOL) tablet 650 mg  650 mg Oral Q4H PRN Michaelle Oviedo MD   650 mg at 06/08/25 1330    Or    acetaminophen (TYLENOL) 160 MG/5ML oral solution 650 mg  650 mg Oral Q4H PRN Michaelle Oviedo MD        Or    acetaminophen (TYLENOL) suppository 650 mg  650 mg Rectal Q4H PRN Michaelle Oviedo MD        albuterol (PROVENTIL) nebulizer solution 0.083% 2.5 mg/3mL  2.5 mg Nebulization Q6H PRN Michaelle Oviedo MD        amitriptyline (ELAVIL) tablet 25 mg  25 mg Oral Nightly Michaelle Oviedo MD   25 mg at 06/09/25 2101    busPIRone (BUSPAR) tablet 15 mg  15 mg Oral 4x Daily Michaelle Oviedo MD   15 mg at 06/10/25 1118    DULoxetine (CYMBALTA) DR capsule 60 mg  60 mg Oral Daily Michaelle Oviedo MD   60 mg at 06/09/25 2100    gabapentin (NEURONTIN) capsule 400 mg  400 mg Oral 4x Daily Michaelle Oviedo MD   400 mg at 06/10/25 1118    HYDROcodone-acetaminophen (NORCO) 7.5-325 MG per tablet 1 tablet  1 tablet Oral Q6H PRN Michaelle Oviedo MD   1 tablet at 06/10/25 0405    hyoscyamine (LEVSIN) SL tablet 125 mcg  125 mcg Sublingual Q6H PRN Michaelle Oviedo MD        melatonin tablet 2.5 mg  2.5 mg Oral Nightly PRN Michaelle Oviedo MD        metoprolol succinate XL (TOPROL-XL) 24 hr tablet 25 mg  25 mg Oral Nightly Michaelle Oviedo MD   25 mg at 06/08/25 2007    ondansetron ODT (ZOFRAN-ODT) disintegrating tablet 4 mg  4 mg Oral Q6H PRN Michaelle Oviedo MD   4 mg at 06/09/25 2100    Or    ondansetron (ZOFRAN) injection 4 mg  4 mg Intravenous Q6H PRN Michaelle Oviedo MD   4 mg at 06/10/25 0825    ondansetron ODT (ZOFRAN-ODT) disintegrating tablet 4 mg  4 mg Translingual Q8H Raman Sharp MD   4 mg at 06/10/25 1119    promethazine (PHENERGAN) tablet 12.5 mg  12.5 mg Oral Q6H PRN Odessa Roman MD   12.5 mg at 06/10/25 0140    Or    promethazine (PHENERGAN)  suppository 12.5 mg  12.5 mg Rectal Q6H PRN Odessa Roman MD        sodium chloride 0.9 % flush 10 mL  10 mL Intravenous PRN Juhi Elmore MD            Physician Progress Notes (last 24 hours)        Raman Sharp MD at 25 1600              Name: Rae Hyde ADMIT: 2025   : 1960  PCP: Rolando Elena MD    MRN: [id number] LOS: 1 days   AGE/SEX: 64 y.o. female  ROOM: [facility]     Subjective   Subjective   She was feeling better this morning.  Requested advancement of diet but apparently got more nauseated after eating and now feels bad again      Objective   Objective   Vital Signs  Temp:  [97.5 °F (36.4 °C)-97.9 °F (36.6 °C)] 97.5 °F (36.4 °C)  Heart Rate:  [54-63] 54  Resp:  [16] 16  BP: (127-137)/(55-67) 127/55  SpO2:  [92 %-96 %] 96 %  on  Flow (L/min) (Oxygen Therapy):  [2] 2;   Device (Oxygen Therapy): nasal cannula  Body mass index is 43.59 kg/m².  Physical Exam  Vitals reviewed.   Constitutional:       General: She is not in acute distress.     Appearance: She is obese.   Cardiovascular:      Rate and Rhythm: Normal rate and regular rhythm.   Pulmonary:      Effort: No respiratory distress.      Breath sounds: Normal breath sounds. No wheezing.   Abdominal:      Palpations: Abdomen is soft.      Tenderness: There is abdominal tenderness.   Musculoskeletal:      Right lower leg: No edema.      Left lower leg: No edema.   Skin:     General: Skin is warm and dry.   Neurological:      Mental Status: She is alert and oriented to person, place, and time.   Psychiatric:         Mood and Affect: Mood normal.       Results Review     I reviewed the patient's new clinical results.  Results from last 7 days   Lab Units 25  0539 25  0731 25  0530 25  1322   WBC 10*3/mm3 7.93 7.73 9.81 10.85*   HEMOGLOBIN g/dL 10.9* 11.1* 11.0* 14.5   PLATELETS 10*3/mm3 178 127* 195 249     Results from last 7 days   Lab Units 25  0539 25  0710  "06/07/25  0530 06/06/25  1322   SODIUM mmol/L 139 137 138 140   POTASSIUM mmol/L 4.0 4.7 3.6 4.3   CHLORIDE mmol/L 105 105 105 98   CO2 mmol/L 23.0 20.3* 23.1 26.4   BUN mg/dL 9.0 11.0 15.0 15.0   CREATININE mg/dL 0.98 1.29* 1.24* 1.47*   GLUCOSE mg/dL 75 77 101* 93   EGFR mL/min/1.73 64.2 46.2* 48.4* 39.5*     Results from last 7 days   Lab Units 06/09/25  0539 06/08/25  0731 06/06/25  1322   ALBUMIN g/dL 3.1* 3.3* 4.2   BILIRUBIN mg/dL 0.4 0.4 0.9   ALK PHOS U/L 93 93 133*   AST (SGOT) U/L 30 34* 44*   ALT (SGPT) U/L 20 23 33     Results from last 7 days   Lab Units 06/09/25  0539 06/08/25  0731 06/07/25  0530 06/06/25  1322   CALCIUM mg/dL 8.2* 8.3* 8.2* 10.0   ALBUMIN g/dL 3.1* 3.3*  --  4.2     Results from last 7 days   Lab Units 06/06/25  1614 06/06/25  1322   LACTATE mmol/L 1.0 3.4*     No results found for: \"HGBA1C\", \"POCGLU\"    No radiology results for the last day    I have personally reviewed all medications:  Scheduled Medications  amitriptyline, 25 mg, Oral, Nightly  busPIRone, 15 mg, Oral, 4x Daily  DULoxetine, 60 mg, Oral, Daily  gabapentin, 400 mg, Oral, 4x Daily  metoprolol succinate XL, 25 mg, Oral, Nightly  Scopolamine, 1 patch, Transdermal, Q72H    Infusions   Diet  Diet: Gastrointestinal; Fiber-Restricted; Texture: Soft to Chew (NDD 3); Soft to Chew: Whole Meat; Fluid Consistency: Thin (IDDSI 0)    I have personally reviewed:  [x]  Laboratory   [x]  Microbiology   [x]  Radiology   [x]  EKG/Telemetry  [x]  Cardiology/Vascular   []  Pathology    []  Records      Assessment/Plan     Active Hospital Problems    Diagnosis  POA    **Colitis [K52.9]  Yes    Hypertension [I10]  Yes    Crohn's disease [K50.90]  Yes      Resolved Hospital Problems    Diagnosis Date Resolved POA    Nausea & vomiting [R11.2] 06/09/2025 Yes       64 y.o. female with history of Crohn's admitted with Colitis.    Not clear if colitis related to Crohn's versus infectious pathogen although GI PCR and C. difficile negative.  " Attempted to advance diet and discharge but she has not done well with that thus far.  Will monitor overnight, continue antiemetics as needed and reevaluate for potential DC tomorrow  - GI signed off  - Fecal calprotectin not obtained as patient has not had a stool.  ESR slightly elevated at 40 although not severely high at her age    Hypertension stable    Renal function at or below usual baseline    JULIANA: Does not have CPAP or but using oxygen as needed      DVT prophy: SCDs  Disposition: Hold DC today, reeval tomorrow  Discussed with RN    Raman Sharp MD  [facility]  25  16:00 EDT    Electronically signed by Raman Sharp MD at 25 1602           Raman Sharp MD   Physician  Hospitalist     Progress Notes     Signed     Date of Service: 06/10/25 1610  Creation Time: 06/10/25 1610     Signed       Expand All Collapse All         Name: Rae Hyde ADMIT: 2025   : 1960  PCP: Rolando Elena MD    MRN: [id number] LOS: 2 days   AGE/SEX: 64 y.o. female  ROOM: [facility]      Subjective      Subjective  She continues to feel pretty nauseated when she tries to eat        Objective      Objective  Vital Signs  Temp:  [97.3 °F (36.3 °C)-98.2 °F (36.8 °C)] 98.1 °F (36.7 °C)  Heart Rate:  [53-62] 62  Resp:  [16] 16  BP: (132-143)/(58-70) 143/70  SpO2:  [90 %-95 %] 94 %  on  Flow (L/min) (Oxygen Therapy):  [2] 2;   Device (Oxygen Therapy): nasal cannula  Body mass index is 43.59 kg/m².  Physical Exam  Vitals reviewed.   Constitutional:       General: She is not in acute distress.     Appearance: She is obese.   Cardiovascular:      Rate and Rhythm: Normal rate and regular rhythm.   Pulmonary:      Effort: No respiratory distress.      Breath sounds: Normal breath sounds. No wheezing.   Abdominal:      Palpations: Abdomen is soft.      Tenderness: There is abdominal tenderness.   Musculoskeletal:      Right lower leg: No edema.       "Left lower leg: No edema.   Skin:     General: Skin is warm and dry.   Neurological:      Mental Status: She is alert and oriented to person, place, and time.   Psychiatric:         Mood and Affect: Mood normal.         Results Review     I reviewed the patient's new clinical results.          Results from last 7 days   Lab Units 06/10/25  0500 06/09/25  0539 06/08/25  0731 06/07/25  0530   WBC 10*3/mm3 6.72 7.93 7.73 9.81   HEMOGLOBIN g/dL 11.2* 10.9* 11.1* 11.0*   PLATELETS 10*3/mm3 172 178 127* 195              Results from last 7 days   Lab Units 06/10/25  0500 06/09/25  0539 06/08/25  0731 06/07/25  0530   SODIUM mmol/L 139 139 137 138   POTASSIUM mmol/L 3.8 4.0 4.7 3.6   CHLORIDE mmol/L 103 105 105 105   CO2 mmol/L 26.2 23.0 20.3* 23.1   BUN mg/dL 8.0 9.0 11.0 15.0   CREATININE mg/dL 0.97 0.98 1.29* 1.24*   GLUCOSE mg/dL 81 75 77 101*   EGFR mL/min/1.73 65.0 64.2 46.2* 48.4*              Results from last 7 days   Lab Units 06/10/25  0500 06/09/25  0539 06/08/25  0731 06/06/25  1322   ALBUMIN g/dL 3.4* 3.1* 3.3* 4.2   BILIRUBIN mg/dL 0.4 0.4 0.4 0.9   ALK PHOS U/L 97 93 93 133*   AST (SGOT) U/L 24 30 34* 44*   ALT (SGPT) U/L 20 20 23 33               Results from last 7 days   Lab Units 06/10/25  0500 06/09/25  0539 06/08/25  0731 06/07/25  0530 06/06/25  1322   CALCIUM mg/dL 8.7 8.2* 8.3* 8.2* 10.0   ALBUMIN g/dL 3.4* 3.1* 3.3*  --  4.2            Results from last 7 days   Lab Units 06/06/25  1614 06/06/25  1322   LACTATE mmol/L 1.0 3.4*      No results found for: \"HGBA1C\", \"POCGLU\"     No radiology results for the last day     I have personally reviewed all medications:  Scheduled Medications    Scheduled Medication   amitriptyline, 25 mg, Oral, Nightly  busPIRone, 15 mg, Oral, 4x Daily  DULoxetine, 60 mg, Oral, Daily  gabapentin, 400 mg, Oral, 4x Daily  metoprolol succinate XL, 25 mg, Oral, Nightly  ondansetron ODT, 4 mg, Translingual, Q8H      Infusions  Infusion Medications       Diet  Diet: " Gastrointestinal; Fiber-Restricted; Texture: Soft to Chew (NDD 3); Soft to Chew: Whole Meat; Fluid Consistency: Thin (IDDSI 0)     I have personally reviewed:  [x]  Laboratory   []  Microbiology   []  Radiology   []  EKG/Telemetry  []  Cardiology/Vascular   []  Pathology    []  Records        Assessment/Plan            Active Hospital Problems     Diagnosis   POA    **Colitis [K52.9]   Yes    Hypertension [I10]   Yes    Crohn's disease [K50.90]   Yes       Resolved Hospital Problems     Diagnosis Date Resolved POA    Nausea & vomiting [R11.2] 06/09/2025 Yes         64 y.o. female with history of Crohn's admitted with Colitis.     Not clear if colitis related to Crohn's versus infectious pathogen although GI PCR and C. difficile negative.  Attempted to advance diet and discharge but she has not done well with that thus far.  - Schedule Zofran.  Will stop scopolamine since it does not seem to be helping   - GI signed off  - Fecal calprotectin not obtained.  ESR slightly elevated at 40 although not severely high at her age     Hypertension stable     Renal function at or below usual baseline     JULIANA: Does not have CPAP or but using oxygen nightly        DVT prophy: SCDs  Disposition: Possible DC tomorrow if she is doing better  Discussed with RN and staff on MDR     Raman Sharp MD  [facility]  06/10/25  16:10 EDT

## 2025-06-10 NOTE — PLAN OF CARE
Problem: Adult Inpatient Plan of Care  Goal: Absence of Hospital-Acquired Illness or Injury  Outcome: Progressing  Goal: Optimal Comfort and Wellbeing  Outcome: Progressing  Intervention: Provide Person-Centered Care  Recent Flowsheet Documentation  Taken 6/9/2025 1955 by Bari Mckeon RN  Trust Relationship/Rapport:   care explained   choices provided   emotional support provided   empathic listening provided   questions answered   questions encouraged   reassurance provided   thoughts/feelings acknowledged  Goal: Readiness for Transition of Care  Outcome: Progressing     Problem: Comorbidity Management  Goal: Blood Pressure in Desired Range  Outcome: Progressing     Problem: Nausea and Vomiting  Goal: Nausea and Vomiting Relief  Outcome: Progressing     Problem: Pain Acute  Goal: Optimal Pain Control and Function  Outcome: Progressing  Intervention: Optimize Psychosocial Wellbeing  Recent Flowsheet Documentation  Taken 6/9/2025 1955 by Bari Mckeon RN  Diversional Activities: television   Goal Outcome Evaluation:      Blood pressure remains stable during shift. Patient remains free from fall or injury. Patient had complained of some nausea and pain relieved by PRN medications.

## 2025-06-11 ENCOUNTER — READMISSION MANAGEMENT (OUTPATIENT)
Dept: CALL CENTER | Facility: HOSPITAL | Age: 65
End: 2025-06-11
Payer: COMMERCIAL

## 2025-06-11 VITALS
TEMPERATURE: 98.2 F | BODY MASS INDEX: 41.21 KG/M2 | SYSTOLIC BLOOD PRESSURE: 166 MMHG | HEIGHT: 64 IN | OXYGEN SATURATION: 90 % | DIASTOLIC BLOOD PRESSURE: 91 MMHG | WEIGHT: 241.4 LBS | HEART RATE: 61 BPM | RESPIRATION RATE: 18 BRPM

## 2025-06-11 LAB
ALBUMIN SERPL-MCNC: 3.4 G/DL (ref 3.5–5.2)
ALBUMIN/GLOB SERPL: 1 G/DL
ALP SERPL-CCNC: 101 U/L (ref 39–117)
ALT SERPL W P-5'-P-CCNC: 17 U/L (ref 1–33)
ANION GAP SERPL CALCULATED.3IONS-SCNC: 8.8 MMOL/L (ref 5–15)
AST SERPL-CCNC: 25 U/L (ref 1–32)
BACTERIA SPEC AEROBE CULT: NORMAL
BACTERIA SPEC AEROBE CULT: NORMAL
BILIRUB SERPL-MCNC: 0.4 MG/DL (ref 0–1.2)
BUN SERPL-MCNC: 8 MG/DL (ref 8–23)
BUN/CREAT SERPL: 8.1 (ref 7–25)
CALCIUM SPEC-SCNC: 8.8 MG/DL (ref 8.6–10.5)
CHLORIDE SERPL-SCNC: 104 MMOL/L (ref 98–107)
CO2 SERPL-SCNC: 28.2 MMOL/L (ref 22–29)
CREAT SERPL-MCNC: 0.99 MG/DL (ref 0.57–1)
DEPRECATED RDW RBC AUTO: 46.4 FL (ref 37–54)
EGFRCR SERPLBLD CKD-EPI 2021: 63.4 ML/MIN/1.73
ERYTHROCYTE [DISTWIDTH] IN BLOOD BY AUTOMATED COUNT: 14.3 % (ref 12.3–15.4)
GLOBULIN UR ELPH-MCNC: 3.3 GM/DL
GLUCOSE SERPL-MCNC: 100 MG/DL (ref 65–99)
HCT VFR BLD AUTO: 35.6 % (ref 34–46.6)
HGB BLD-MCNC: 11.3 G/DL (ref 12–15.9)
MCH RBC QN AUTO: 28.2 PG (ref 26.6–33)
MCHC RBC AUTO-ENTMCNC: 31.7 G/DL (ref 31.5–35.7)
MCV RBC AUTO: 88.8 FL (ref 79–97)
PLATELET # BLD AUTO: 177 10*3/MM3 (ref 140–450)
PMV BLD AUTO: 10.3 FL (ref 6–12)
POTASSIUM SERPL-SCNC: 3.3 MMOL/L (ref 3.5–5.2)
PROT SERPL-MCNC: 6.7 G/DL (ref 6–8.5)
RBC # BLD AUTO: 4.01 10*6/MM3 (ref 3.77–5.28)
SODIUM SERPL-SCNC: 141 MMOL/L (ref 136–145)
WBC NRBC COR # BLD AUTO: 7.22 10*3/MM3 (ref 3.4–10.8)

## 2025-06-11 PROCEDURE — 63710000001 ONDANSETRON ODT 4 MG TABLET DISPERSIBLE: Performed by: HOSPITALIST

## 2025-06-11 PROCEDURE — 80053 COMPREHEN METABOLIC PANEL: CPT | Performed by: STUDENT IN AN ORGANIZED HEALTH CARE EDUCATION/TRAINING PROGRAM

## 2025-06-11 PROCEDURE — 85027 COMPLETE CBC AUTOMATED: CPT | Performed by: STUDENT IN AN ORGANIZED HEALTH CARE EDUCATION/TRAINING PROGRAM

## 2025-06-11 RX ORDER — ONDANSETRON 4 MG/1
4 TABLET, ORALLY DISINTEGRATING ORAL EVERY 8 HOURS PRN
Qty: 30 TABLET | Refills: 0 | Status: SHIPPED | OUTPATIENT
Start: 2025-06-11

## 2025-06-11 RX ORDER — POTASSIUM CHLORIDE 1500 MG/1
40 TABLET, EXTENDED RELEASE ORAL ONCE
Status: COMPLETED | OUTPATIENT
Start: 2025-06-11 | End: 2025-06-11

## 2025-06-11 RX ADMIN — GABAPENTIN 400 MG: 400 CAPSULE ORAL at 11:31

## 2025-06-11 RX ADMIN — BUSPIRONE HYDROCHLORIDE 15 MG: 15 TABLET ORAL at 07:24

## 2025-06-11 RX ADMIN — HYDROCODONE BITARTRATE AND ACETAMINOPHEN 1 TABLET: 7.5; 325 TABLET ORAL at 07:26

## 2025-06-11 RX ADMIN — ONDANSETRON 4 MG: 4 TABLET, ORALLY DISINTEGRATING ORAL at 02:19

## 2025-06-11 RX ADMIN — ONDANSETRON 4 MG: 4 TABLET, ORALLY DISINTEGRATING ORAL at 10:19

## 2025-06-11 RX ADMIN — BUSPIRONE HYDROCHLORIDE 15 MG: 15 TABLET ORAL at 11:31

## 2025-06-11 RX ADMIN — GABAPENTIN 400 MG: 400 CAPSULE ORAL at 07:24

## 2025-06-11 RX ADMIN — POTASSIUM CHLORIDE 40 MEQ: 1500 TABLET, EXTENDED RELEASE ORAL at 11:31

## 2025-06-11 NOTE — DISCHARGE SUMMARY
Patient Name: Rae Hyde  : 1960  MRN: [id number]    Date of Admission: 2025  Date of Discharge:  2025  Primary Care Physician: Rolando Elena MD      Chief Complaint:   Abdominal Pain, Nausea, and Vomiting      Discharge Diagnoses     Active Hospital Problems    Diagnosis  POA    **Colitis [K52.9]  Yes    Hypertension [I10]  Yes    Crohn's disease [K50.90]  Yes      Resolved Hospital Problems    Diagnosis Date Resolved POA    Nausea & vomiting [R11.2] 2025 Yes        Hospital Course     Ms. Hyde is a 64 y.o. female with a history of Crohn's and HTN who presented to [facility] initially complaining of nausea, diarrhea and abdominal pain.  Please see the admitting history and physical for further details.  CT shows only small amount of colon wall thickening consistent with colitis and diarrhea along with periportal adenopathy similar to prior scans.  She was admitted and started on some fluids with GI consulting.  Sed rate was obtained and was only minimally elevated.  Stool studies were obtained and were negative for any infectious pathogens.  A fecal calprotectin was ordered but was never obtained as patient's bowel movements stopped.  GI signed off and recommended outpatient follow-up but her discharge was held for a couple of days while we tried to work on her nausea.  We were really not able to advance her diet until finally scheduled some Zofran which worked very well for her and she was able to eat some overnight and this morning to the point where she felt able to go home.      Day of Discharge     Subjective:  No events.  Still some abdominal soreness but overall much better and able to eat some    Physical Exam:  Temp:  [97.5 °F (36.4 °C)-98.2 °F (36.8 °C)] 98.2 °F (36.8 °C)  Heart Rate:  [60-66] 61  Resp:  [16-18] 18  BP: (125-166)/(61-91) 166/91  Body mass index is 41.44 kg/m².  Physical Exam  Vitals reviewed.   Constitutional:       General: She  is not in acute distress.     Appearance: She is obese.   Cardiovascular:      Rate and Rhythm: Normal rate.   Pulmonary:      Effort: No respiratory distress.   Abdominal:      Palpations: Abdomen is soft.      Tenderness: There is abdominal tenderness.   Musculoskeletal:      Right lower leg: No edema.      Left lower leg: No edema.   Skin:     General: Skin is warm and dry.   Neurological:      Mental Status: She is alert and oriented to person, place, and time.   Psychiatric:         Mood and Affect: Mood normal.         Consultants     Consult Orders (all) (From admission, onward)       Start     Ordered    06/06/25 2038  Inpatient Gastroenterology Consult  Once        Specialty:  Gastroenterology  Provider:  Anisha De Oliveira MD    06/06/25 2038 06/06/25 1551  [facility] (on-call MD unless specified) Details  Once        Specialty:  Hospitalist  Provider:  Michaelle Oviedo MD    06/06/25 1550                  Procedures     Imaging Results (All)       Procedure Component Value Units Date/Time    CT Abdomen Pelvis With Contrast [[id number]] Collected: 06/06/25 1524     Updated: 06/06/25 1539    Narrative:      CT ABDOMEN PELVIS W CONTRAST-     INDICATION: Upper abdominal pain, with nausea and diarrhea     COMPARISON: CT abdomen pelvis April 10, 2023     TECHNIQUE:  Routine CT abdomen and pelvis with IV contrast. Coronal and sagittal  reformats. Radiation dose reduction techniques were utilized, including  automated exposure control and exposure modulation based on body size.     FINDINGS:      Lung bases: Subsegmental atelectasis at the bases.     ABDOMEN: Normal liver. Cholecystectomy. No biliary ductal dilatation.  Spleen is normal in size. Mild to moderate pancreatic atrophy. No  pancreatic ductal dilatation or mass seen. No adrenal nodules. No  solid-appearing renal mass or hydronephrosis.     Pelvis: No bladder calculus. Hysterectomy. No adnexal mass. Surgical  clips in the pelvis.     Bowel: Small  lipoma at the duodenal jejunal flexure, series 2, axial  mage 50, measures 1.5 cm. No small bowel obstruction. Rectum is  collapsed. Gas and fluid in the colonic lumen. Colon is under distended.  Suspect a small amount of colonic wall thickening. Normal appendix.  Abdominal wall: Rectus diastases. Periumbilical and pelvic wall  scarring.     Retroperitoneum: No retroperitoneal lymphadenopathy. Periportal  lymphadenopathy. For example, periportal lymph node, series 2, axial  mage 40, measures 1.1 cm, unchanged. For example, a portal caval lymph  node, series 2, axial mage 45, measures 1.3 cm, unchanged.     Vasculature: Patent. No abdominal aortic aneurysm. Mild aortic  atherosclerotic calcification.     Osseous structures: Bilateral femoral head osteophyte lipping.  Exaggerated lower lumbar lordosis. Bilateral L5 pars defects with slight  anterolisthesis of L5 on S1. Lower lumbar facet degenerative  arthropathy.       Impression:         1. Small amount of colonic wall thickening with gas and fluid in the  colonic lumen. Suspect colitis and diarrhea.  2. Mild periportal adenopathy is similar to prior and therefore most  likely reactive. Finding can be correlated with liver function tests.     This report was finalized on 6/6/2025 3:36 PM by Dr. Leon Delacruz M.D  on Workstation: [id number]                 Pertinent Labs     Results from last 7 days   Lab Units 06/11/25  0535 06/10/25  0500 06/09/25  0539 06/08/25  0731   WBC 10*3/mm3 7.22 6.72 7.93 7.73   HEMOGLOBIN g/dL 11.3* 11.2* 10.9* 11.1*   PLATELETS 10*3/mm3 177 172 178 127*     Results from last 7 days   Lab Units 06/11/25  0535 06/10/25  0500 06/09/25  0539 06/08/25  0731   SODIUM mmol/L 141 139 139 137   POTASSIUM mmol/L 3.3* 3.8 4.0 4.7   CHLORIDE mmol/L 104 103 105 105   CO2 mmol/L 28.2 26.2 23.0 20.3*   BUN mg/dL 8.0 8.0 9.0 11.0   CREATININE mg/dL 0.99 0.97 0.98 1.29*   GLUCOSE mg/dL 100* 81 75 77   EGFR mL/min/1.73 63.4 65.0 64.2 46.2*  "    Results from last 7 days   Lab Units 06/11/25  0535 06/10/25  0500 06/09/25  0539 06/08/25  0731   ALBUMIN g/dL 3.4* 3.4* 3.1* 3.3*   BILIRUBIN mg/dL 0.4 0.4 0.4 0.4   ALK PHOS U/L 101 97 93 93   AST (SGOT) U/L 25 24 30 34*   ALT (SGPT) U/L 17 20 20 23     Results from last 7 days   Lab Units 06/11/25  0535 06/10/25  0500 06/09/25  0539 06/08/25  0731   CALCIUM mg/dL 8.8 8.7 8.2* 8.3*   ALBUMIN g/dL 3.4* 3.4* 3.1* 3.3*     Results from last 7 days   Lab Units 06/06/25  1322   LIPASE U/L 29             Invalid input(s): \"LDLCALC\"  Results from last 7 days   Lab Units 06/06/25  1623 06/06/25  1614   BLOODCX  No growth at 4 days No growth at 4 days         Test Results Pending at Discharge     Pending Results       Procedure [Order ID] Specimen - Date/Time    Calprotectin, Fecal - Stool, Per Rectum [[id number]]     Specimen: Stool from Per Rectum               Discharge Details        Discharge Medications        New Medications        Instructions Start Date   ondansetron ODT 4 MG disintegrating tablet  Commonly known as: ZOFRAN-ODT   4 mg, Translingual, Every 8 Hours PRN             Continue These Medications        Instructions Start Date   albuterol sulfate  (90 Base) MCG/ACT inhaler  Commonly known as: PROVENTIL HFA;VENTOLIN HFA;PROAIR HFA   2 puffs, Inhalation      amitriptyline 25 MG tablet  Commonly known as: ELAVIL   25 mg, Nightly      busPIRone 15 MG tablet  Commonly known as: BUSPAR   15 mg, 4 Times Daily      butalbital-acetaminophen-caffeine -40 MG per tablet  Commonly known as: FIORICET, ESGIC   2 tablets, Oral, Every 6 Hours PRN      cyanocobalamin 1000 MCG/ML injection       DULoxetine 60 MG capsule  Commonly known as: CYMBALTA   60 mg, Daily      ezetimibe 10 MG tablet  Commonly known as: ZETIA   10 mg, Daily      gabapentin 400 MG capsule  Commonly known as: NEURONTIN   400 mg, 4 Times Daily      HYDROcodone-acetaminophen 7.5-325 MG per tablet  Commonly known as: NORCO   1 tablet, " Every 6 Hours PRN      hydrOXYzine 25 MG tablet  Commonly known as: ATARAX   TAKE ONE TABLET BY MOUTH AT NIGHT IF NEEDED FOR ITCHING      hyoscyamine sulfate 0.125 MG tablet dispersible disintegrating tablet  Commonly known as: ANASPAZ   125 mcg, Translingual, 3 Times Daily PRN      meclizine 25 MG tablet  Commonly known as: ANTIVERT   25 mg, Oral, 3 Times Daily PRN      metoprolol succinate XL 25 MG 24 hr tablet  Commonly known as: TOPROL-XL   TAKE 1 TABLET DAILY      promethazine 25 MG tablet  Commonly known as: PHENERGAN   25 mg, Every 6 Hours PRN      Qulipta 60 MG tablet  Generic drug: Atogepant   60 mg, Oral, Daily      ubrogepant 100 MG tablet  Commonly known as: UBRELVY   100 mg, Daily PRN             Stop These Medications      aspirin 81 MG tablet            ASK your doctor about these medications        Instructions Start Date   SUMAtriptan 50 MG tablet  Commonly known as: IMITREX   50 mg, Oral, Every 2 Hours PRN               Allergies   Allergen Reactions    Latex Rash and Hives    Peg 3350-Kcl-Na Bicarb-Nacl Rash and Hives     Possibly container?    Pravastatin Nausea Only    Simvastatin Nausea Only       Discharge Disposition:  Home or Self Care      Discharge Diet:  Diet Order   Procedures    Diet: Gastrointestinal; Fiber-Restricted; Texture: Soft to Chew (NDD 3); Soft to Chew: Whole Meat; Fluid Consistency: Thin (IDDSI 0)       Discharge Activity:       CODE STATUS:    Code Status and Medical Interventions: CPR (Attempt to Resuscitate); Full   Ordered at: 06/06/25 1603     Code Status (Patient has no pulse and is not breathing):    CPR (Attempt to Resuscitate)     Medical Interventions (Patient has pulse or is breathing):    Full       Future Appointments   Date Time Provider Department Center   9/24/2025 11:00 AM Toni Funk MD [id number]      Follow-up Information       Rolando Elena MD .    Specialty: Family Medicine  Contact information:  [address]  [facility] [address] [phone]               Kavon Arias MD Follow up in 1 week(s).    Specialty: Family Medicine  Contact information:  [address]  [facility] [address]  [facility] [address] [phone]                             Time Spent on Discharge:  Greater than 30 minutes      Raman Sharp MD  [facility]  06/11/25  14:53 EDT

## 2025-06-11 NOTE — PLAN OF CARE
Problem: Adult Inpatient Plan of Care  Goal: Absence of Hospital-Acquired Illness or Injury  Outcome: Progressing  Goal: Optimal Comfort and Wellbeing  Outcome: Progressing  Intervention: Provide Person-Centered Care  Recent Flowsheet Documentation  Taken 6/10/2025 1920 by Bari Mckeon, RN  Trust Relationship/Rapport:   care explained   choices provided   emotional support provided   empathic listening provided   questions answered   questions encouraged   reassurance provided   thoughts/feelings acknowledged  Goal: Readiness for Transition of Care  Outcome: Progressing     Problem: Fall Injury Risk  Goal: Absence of Fall and Fall-Related Injury  Outcome: Progressing     Problem: Sepsis/Septic Shock  Goal: Blood Glucose Level Within Target Range  Outcome: Progressing     Problem: Nausea and Vomiting  Goal: Nausea and Vomiting Relief  Outcome: Progressing     Problem: Pain Acute  Goal: Optimal Pain Control and Function  Outcome: Progressing  Intervention: Optimize Psychosocial Wellbeing  Recent Flowsheet Documentation  Taken 6/10/2025 1920 by Bari Mckeon, RN  Diversional Activities: television   Goal Outcome Evaluation:

## 2025-06-11 NOTE — CASE MANAGEMENT/SOCIAL WORK
Case Management Discharge Note      Final Note: DC home    Provided Post Acute Provider List?: N/A  Provided Post Acute Provider Quality & Resource List?: N/A    Selected Continued Care - Discharged on 6/11/2025 Admission date: 6/6/2025 - Discharge disposition: Home or Self Care      Destination    No services have been selected for the patient.                Durable Medical Equipment    No services have been selected for the patient.                Dialysis/Infusion    No services have been selected for the patient.                Home Medical Care    No services have been selected for the patient.                Therapy    No services have been selected for the patient.                Community Resources    No services have been selected for the patient.                Community & DME    No services have been selected for the patient.                         Final Discharge Disposition Code: 01 - home or self-care

## 2025-06-12 NOTE — PAYOR COMM NOTE
"Rae Hyde (64 y.o. Female)     PLEASE SEE ATTACHED FOR DC NOTICE    REF #  [id number]     THANK YOU  REBECCA BRANDT RN/ DEPT  [facility]   [phone]  [phone]        Date of Birth   1960    Social Security Number       Address   [address] [place] [address]    Home Phone   [phone]    MRN   [id number]       Yazdanism   Advent    Marital Status                               Admission Date   2025    Admission Type   Emergency    Admitting Provider   Michaelle Oviedo MD    Attending Provider       Department, Room/Bed   [facility], S509/1       Discharge Date   2025    Discharge Disposition   Home or Self Care    Discharge Destination                                 Attending Provider: (none)   Allergies: Latex, Peg 3350-kcl-na Bicarb-nacl, Pravastatin, Simvastatin    Isolation: None   Infection: None   Code Status: Prior    Ht: 162.6 cm (64\")   Wt: 109 kg (241 lb 6.4 oz)    Admission Cmt: None   Principal Problem: Colitis [K52.9]                   Active Insurance as of 2025       Primary Coverage       Payor Plan Insurance Group Employer/Plan Group    [employer]HEM BLUE CROSS ANTHEM BLUE CROSS BLUE SHIELD PPO [id number]       Payor Plan Address Payor Plan Phone Number Payor Plan Fax Number Effective Dates    [address] [phone]  2018 - None Entered    [facility] [address]         Subscriber Name Subscriber Birth Date Member ID       RAE HYDE 1960 [id number]                     Emergency Contacts        (Rel.) Home Phone Work Phone Mobile Phone    Elan Hyde (Spouse) -- -- [phone]    Mary EllenKerry (Daughter) -- -- [phone]    MayTammy (Daughter) -- -- --    Mary Shahid (Daughter) -- -- --              Froid: [facility] [id number]  Tax ID [id number]     Discharge Summary        Raman Sharp MD at 25 1403              Patient Name: Rae Hyde  : " 1960  MRN: [id number]    Date of Admission: 6/6/2025  Date of Discharge:  6/11/2025  Primary Care Physician: Rolando Elena MD      Chief Complaint:   Abdominal Pain, Nausea, and Vomiting      Discharge Diagnoses     Active Hospital Problems    Diagnosis  POA    **Colitis [K52.9]  Yes    Hypertension [I10]  Yes    Crohn's disease [K50.90]  Yes      Resolved Hospital Problems    Diagnosis Date Resolved POA    Nausea & vomiting [R11.2] 06/09/2025 Yes        Hospital Course     Ms. Hyde is a 64 y.o. female with a history of Crohn's and HTN who presented to [facility] initially complaining of nausea, diarrhea and abdominal pain.  Please see the admitting history and physical for further details.  CT shows only small amount of colon wall thickening consistent with colitis and diarrhea along with periportal adenopathy similar to prior scans.  She was admitted and started on some fluids with GI consulting.  Sed rate was obtained and was only minimally elevated.  Stool studies were obtained and were negative for any infectious pathogens.  A fecal calprotectin was ordered but was never obtained as patient's bowel movements stopped.  GI signed off and recommended outpatient follow-up but her discharge was held for a couple of days while we tried to work on her nausea.  We were really not able to advance her diet until finally scheduled some Zofran which worked very well for her and she was able to eat some overnight and this morning to the point where she felt able to go home.      Day of Discharge     Subjective:  No events.  Still some abdominal soreness but overall much better and able to eat some    Physical Exam:  Temp:  [97.5 °F (36.4 °C)-98.2 °F (36.8 °C)] 98.2 °F (36.8 °C)  Heart Rate:  [60-66] 61  Resp:  [16-18] 18  BP: (125-166)/(61-91) 166/91  Body mass index is 41.44 kg/m².  Physical Exam  Vitals reviewed.   Constitutional:       General: She is not in acute distress.      Appearance: She is obese.   Cardiovascular:      Rate and Rhythm: Normal rate.   Pulmonary:      Effort: No respiratory distress.   Abdominal:      Palpations: Abdomen is soft.      Tenderness: There is abdominal tenderness.   Musculoskeletal:      Right lower leg: No edema.      Left lower leg: No edema.   Skin:     General: Skin is warm and dry.   Neurological:      Mental Status: She is alert and oriented to person, place, and time.   Psychiatric:         Mood and Affect: Mood normal.         Consultants     Consult Orders (all) (From admission, onward)       Start     Ordered    06/06/25 2038  Inpatient Gastroenterology Consult  Once        Specialty:  Gastroenterology  Provider:  Anisha De Oliveira MD    06/06/25 2038 06/06/25 1551  [facility] (on-call MD unless specified) Details  Once        Specialty:  Hospitalist  Provider:  Michaelle Oviedo MD    06/06/25 1550                  Procedures     Imaging Results (All)       Procedure Component Value Units Date/Time    CT Abdomen Pelvis With Contrast [[id number]] Collected: 06/06/25 1524     Updated: 06/06/25 1539    Narrative:      CT ABDOMEN PELVIS W CONTRAST-     INDICATION: Upper abdominal pain, with nausea and diarrhea     COMPARISON: CT abdomen pelvis April 10, 2023     TECHNIQUE:  Routine CT abdomen and pelvis with IV contrast. Coronal and sagittal  reformats. Radiation dose reduction techniques were utilized, including  automated exposure control and exposure modulation based on body size.     FINDINGS:      Lung bases: Subsegmental atelectasis at the bases.     ABDOMEN: Normal liver. Cholecystectomy. No biliary ductal dilatation.  Spleen is normal in size. Mild to moderate pancreatic atrophy. No  pancreatic ductal dilatation or mass seen. No adrenal nodules. No  solid-appearing renal mass or hydronephrosis.     Pelvis: No bladder calculus. Hysterectomy. No adnexal mass. Surgical  clips in the pelvis.     Bowel: Small lipoma at the duodenal jejunal  flexure, series 2, axial  mage 50, measures 1.5 cm. No small bowel obstruction. Rectum is  collapsed. Gas and fluid in the colonic lumen. Colon is under distended.  Suspect a small amount of colonic wall thickening. Normal appendix.  Abdominal wall: Rectus diastases. Periumbilical and pelvic wall  scarring.     Retroperitoneum: No retroperitoneal lymphadenopathy. Periportal  lymphadenopathy. For example, periportal lymph node, series 2, axial  mage 40, measures 1.1 cm, unchanged. For example, a portal caval lymph  node, series 2, axial mage 45, measures 1.3 cm, unchanged.     Vasculature: Patent. No abdominal aortic aneurysm. Mild aortic  atherosclerotic calcification.     Osseous structures: Bilateral femoral head osteophyte lipping.  Exaggerated lower lumbar lordosis. Bilateral L5 pars defects with slight  anterolisthesis of L5 on S1. Lower lumbar facet degenerative  arthropathy.       Impression:         1. Small amount of colonic wall thickening with gas and fluid in the  colonic lumen. Suspect colitis and diarrhea.  2. Mild periportal adenopathy is similar to prior and therefore most  likely reactive. Finding can be correlated with liver function tests.     This report was finalized on 6/6/2025 3:36 PM by Dr. Leon Delacruz M.D  on Workstation: [id number]                 Pertinent Labs     Results from last 7 days   Lab Units 06/11/25  0535 06/10/25  0500 06/09/25  0539 06/08/25  0731   WBC 10*3/mm3 7.22 6.72 7.93 7.73   HEMOGLOBIN g/dL 11.3* 11.2* 10.9* 11.1*   PLATELETS 10*3/mm3 177 172 178 127*     Results from last 7 days   Lab Units 06/11/25  0535 06/10/25  0500 06/09/25  0539 06/08/25  0731   SODIUM mmol/L 141 139 139 137   POTASSIUM mmol/L 3.3* 3.8 4.0 4.7   CHLORIDE mmol/L 104 103 105 105   CO2 mmol/L 28.2 26.2 23.0 20.3*   BUN mg/dL 8.0 8.0 9.0 11.0   CREATININE mg/dL 0.99 0.97 0.98 1.29*   GLUCOSE mg/dL 100* 81 75 77   EGFR mL/min/1.73 63.4 65.0 64.2 46.2*     Results from last 7 days   Lab Units  "06/11/25  0535 06/10/25  0500 06/09/25  0539 06/08/25  0731   ALBUMIN g/dL 3.4* 3.4* 3.1* 3.3*   BILIRUBIN mg/dL 0.4 0.4 0.4 0.4   ALK PHOS U/L 101 97 93 93   AST (SGOT) U/L 25 24 30 34*   ALT (SGPT) U/L 17 20 20 23     Results from last 7 days   Lab Units 06/11/25  0535 06/10/25  0500 06/09/25  0539 06/08/25  0731   CALCIUM mg/dL 8.8 8.7 8.2* 8.3*   ALBUMIN g/dL 3.4* 3.4* 3.1* 3.3*     Results from last 7 days   Lab Units 06/06/25  1322   LIPASE U/L 29             Invalid input(s): \"LDLCALC\"  Results from last 7 days   Lab Units 06/06/25  1623 06/06/25  1614   BLOODCX  No growth at 4 days No growth at 4 days         Test Results Pending at Discharge     Pending Results       Procedure [Order ID] Specimen - Date/Time    Calprotectin, Fecal - Stool, Per Rectum [[id number]]     Specimen: Stool from Per Rectum               Discharge Details        Discharge Medications        New Medications        Instructions Start Date   ondansetron ODT 4 MG disintegrating tablet  Commonly known as: ZOFRAN-ODT   4 mg, Translingual, Every 8 Hours PRN             Continue These Medications        Instructions Start Date   albuterol sulfate  (90 Base) MCG/ACT inhaler  Commonly known as: PROVENTIL HFA;VENTOLIN HFA;PROAIR HFA   2 puffs, Inhalation      amitriptyline 25 MG tablet  Commonly known as: ELAVIL   25 mg, Nightly      busPIRone 15 MG tablet  Commonly known as: BUSPAR   15 mg, 4 Times Daily      butalbital-acetaminophen-caffeine -40 MG per tablet  Commonly known as: FIORICET, ESGIC   2 tablets, Oral, Every 6 Hours PRN      cyanocobalamin 1000 MCG/ML injection       DULoxetine 60 MG capsule  Commonly known as: CYMBALTA   60 mg, Daily      ezetimibe 10 MG tablet  Commonly known as: ZETIA   10 mg, Daily      gabapentin 400 MG capsule  Commonly known as: NEURONTIN   400 mg, 4 Times Daily      HYDROcodone-acetaminophen 7.5-325 MG per tablet  Commonly known as: NORCO   1 tablet, Every 6 Hours PRN      hydrOXYzine 25 MG " tablet  Commonly known as: ATARAX   TAKE ONE TABLET BY MOUTH AT NIGHT IF NEEDED FOR ITCHING      hyoscyamine sulfate 0.125 MG tablet dispersible disintegrating tablet  Commonly known as: ANASPAZ   125 mcg, Translingual, 3 Times Daily PRN      meclizine 25 MG tablet  Commonly known as: ANTIVERT   25 mg, Oral, 3 Times Daily PRN      metoprolol succinate XL 25 MG 24 hr tablet  Commonly known as: TOPROL-XL   TAKE 1 TABLET DAILY      promethazine 25 MG tablet  Commonly known as: PHENERGAN   25 mg, Every 6 Hours PRN      Qulipta 60 MG tablet  Generic drug: Atogepant   60 mg, Oral, Daily      ubrogepant 100 MG tablet  Commonly known as: UBRELVY   100 mg, Daily PRN             Stop These Medications      aspirin 81 MG tablet            ASK your doctor about these medications        Instructions Start Date   SUMAtriptan 50 MG tablet  Commonly known as: IMITREX   50 mg, Oral, Every 2 Hours PRN               Allergies   Allergen Reactions    Latex Rash and Hives    Peg 3350-Kcl-Na Bicarb-Nacl Rash and Hives     Possibly container?    Pravastatin Nausea Only    Simvastatin Nausea Only       Discharge Disposition:  Home or Self Care      Discharge Diet:  Diet Order   Procedures    Diet: Gastrointestinal; Fiber-Restricted; Texture: Soft to Chew (NDD 3); Soft to Chew: Whole Meat; Fluid Consistency: Thin (IDDSI 0)       Discharge Activity:       CODE STATUS:    Code Status and Medical Interventions: CPR (Attempt to Resuscitate); Full   Ordered at: 06/06/25 1603     Code Status (Patient has no pulse and is not breathing):    CPR (Attempt to Resuscitate)     Medical Interventions (Patient has pulse or is breathing):    Full       Future Appointments   Date Time Provider Department Center   9/24/2025 11:00 AM Toni Funk MD [id number]      Follow-up Information       Rolando Elena MD .    Specialty: Family Medicine  Contact information:  [address]  [place] [address] [phone]                Kavon Arias MD Follow up in 1 week(s).    Specialty: Family Medicine  Contact information:  [address]  [place] [address]  [phone]                             Time Spent on Discharge:  Greater than 30 minutes      Raman Sharp MD  [facility]  06/11/25  14:53 EDT    Electronically signed by Raman Sharp MD at 06/11/25 1783

## 2025-06-12 NOTE — OUTREACH NOTE
Prep Survey      Flowsheet Row Responses   [facility] facility patient discharged from? [place]   Is LACE score < 7 ? No   Eligibility Readm Mgmt   Discharge diagnosis Colitis   Does the patient have one of the following disease processes/diagnoses(primary or secondary)? Other   Does the patient have Home health ordered? No   Is there a DME ordered? No   Prep survey completed? Yes            CLINT HARRIS - Registered Nurse

## 2025-06-13 NOTE — TELEPHONE ENCOUNTER
Schedule at [place] or [facility]-needs to wait 8 weeks before procedure

## 2025-06-16 ENCOUNTER — READMISSION MANAGEMENT (OUTPATIENT)
Dept: CALL CENTER | Facility: HOSPITAL | Age: 65
End: 2025-06-16
Payer: COMMERCIAL

## 2025-06-16 NOTE — OUTREACH NOTE
Medical Week 1 Survey      Flowsheet Row Responses   [facility] patient discharged from? [place]   Does the patient have one of the following disease processes/diagnoses(primary or secondary)? Other   Week 1 attempt successful? Yes   Call start time 1036   Call end time 1038   Discharge diagnosis Colitis   Meds reviewed with patient/caregiver? Yes   Is the patient having any side effects they believe may be caused by any medication additions or changes? No   Does the patient have all medications ordered at discharge? Yes   Is the patient taking all medications as directed (includes completed medication regime)? Yes   Does the patient have a primary care provider?  Yes   Does the patient have an appointment with their PCP within 7 days of discharge? No   What is preventing the patient from scheduling follow up appointments within 7 days of discharge? Waiting on return call   Has the patient kept scheduled appointments due by today? N/A   Has home health visited the patient within 72 hours of discharge? N/A   Psychosocial issues? No   Did the patient receive a copy of their discharge instructions? Yes   Nursing interventions Reviewed instructions with patient   What is the patient's perception of their health status since discharge? Improving  [decreased appetite]   Is the patient/caregiver able to teach back the hierarchy of who to call/visit for symptoms/problems? PCP, Specialist, Home health nurse, Urgent Care, ED, 911 Yes   Week 1 call completed? Yes   Graduated Yes   Would this patient benefit from a Referral to Amb Social Work? No   Is the patient interested in additional calls from an ambulatory ? No   Call end time 1038            Luda VAZ - Registered Nurse

## 2025-08-27 ENCOUNTER — ANESTHESIA EVENT (OUTPATIENT)
Dept: GASTROENTEROLOGY | Facility: HOSPITAL | Age: 65
End: 2025-08-27
Payer: COMMERCIAL

## 2025-08-27 ENCOUNTER — ANESTHESIA (OUTPATIENT)
Dept: GASTROENTEROLOGY | Facility: HOSPITAL | Age: 65
End: 2025-08-27
Payer: COMMERCIAL

## 2025-08-27 ENCOUNTER — HOSPITAL ENCOUNTER (OUTPATIENT)
Facility: HOSPITAL | Age: 65
Setting detail: HOSPITAL OUTPATIENT SURGERY
Discharge: HOME | End: 2025-08-27
Attending: INTERNAL MEDICINE | Admitting: INTERNAL MEDICINE
Payer: COMMERCIAL

## 2025-08-27 VITALS
BODY MASS INDEX: 44.22 KG/M2 | DIASTOLIC BLOOD PRESSURE: 72 MMHG | OXYGEN SATURATION: 98 % | SYSTOLIC BLOOD PRESSURE: 138 MMHG | WEIGHT: 259 LBS | RESPIRATION RATE: 16 BRPM | HEART RATE: 63 BPM | HEIGHT: 64 IN

## 2025-08-27 DIAGNOSIS — K50.919 CROHN'S DISEASE WITH COMPLICATION, UNSPECIFIED GASTROINTESTINAL TRACT LOCATION: ICD-10-CM

## 2025-08-27 DIAGNOSIS — K63.9 COLON WALL THICKENING: ICD-10-CM

## 2025-08-27 PROCEDURE — 25810000003 LACTATED RINGERS PER 1000 ML: Performed by: INTERNAL MEDICINE

## 2025-08-27 PROCEDURE — S0285 CNSLT BEFORE SCREEN COLONOSC: HCPCS | Performed by: INTERNAL MEDICINE

## 2025-08-27 PROCEDURE — 25010000002 PROPOFOL 10 MG/ML EMULSION: Performed by: REGISTERED NURSE

## 2025-08-27 PROCEDURE — 25010000002 LIDOCAINE 2% SOLUTION: Performed by: REGISTERED NURSE

## 2025-08-27 PROCEDURE — 88305 TISSUE EXAM BY PATHOLOGIST: CPT | Performed by: INTERNAL MEDICINE

## 2025-08-27 RX ORDER — LIDOCAINE HYDROCHLORIDE 20 MG/ML
INJECTION, SOLUTION INFILTRATION; PERINEURAL AS NEEDED
Status: DISCONTINUED | OUTPATIENT
Start: 2025-08-27 | End: 2025-08-27 | Stop reason: SURG

## 2025-08-27 RX ORDER — PROPOFOL 10 MG/ML
VIAL (ML) INTRAVENOUS AS NEEDED
Status: DISCONTINUED | OUTPATIENT
Start: 2025-08-27 | End: 2025-08-27 | Stop reason: SURG

## 2025-08-27 RX ORDER — SODIUM CHLORIDE, SODIUM LACTATE, POTASSIUM CHLORIDE, CALCIUM CHLORIDE 600; 310; 30; 20 MG/100ML; MG/100ML; MG/100ML; MG/100ML
1000 INJECTION, SOLUTION INTRAVENOUS CONTINUOUS
Status: DISCONTINUED | OUTPATIENT
Start: 2025-08-27 | End: 2025-08-27 | Stop reason: HOSPADM

## 2025-08-27 RX ADMIN — PROPOFOL 180 MCG/KG/MIN: 10 INJECTION, EMULSION INTRAVENOUS at 14:09

## 2025-08-27 RX ADMIN — LIDOCAINE HYDROCHLORIDE 100 MG: 20 INJECTION, SOLUTION INFILTRATION; PERINEURAL at 14:09

## 2025-08-27 RX ADMIN — SODIUM CHLORIDE, POTASSIUM CHLORIDE, SODIUM LACTATE AND CALCIUM CHLORIDE 1000 ML: 600; 310; 30; 20 INJECTION, SOLUTION INTRAVENOUS at 13:46

## 2025-08-27 RX ADMIN — PROPOFOL 100 MG: 10 INJECTION, EMULSION INTRAVENOUS at 14:09

## 2025-08-29 LAB
CYTO UR: NORMAL
LAB AP CASE REPORT: NORMAL
LAB AP CLINICAL INFORMATION: NORMAL
PATH REPORT.FINAL DX SPEC: NORMAL
PATH REPORT.GROSS SPEC: NORMAL

## (undated) DEVICE — CATH DIAG IMPULSE FL3.5 5F 100CM

## (undated) DEVICE — KT MANIFLD CARDIAC

## (undated) DEVICE — CATH DIAG IMPULSE PIG 5F 100CM

## (undated) DEVICE — KT ORCA ORCAPOD DISP STRL

## (undated) DEVICE — PK CATH CARD 40

## (undated) DEVICE — GLIDESHEATH BASIC HYDROPHILIC COATED INTRODUCER SHEATH: Brand: GLIDESHEATH

## (undated) DEVICE — TUBING, SUCTION, 1/4" X 10', STRAIGHT: Brand: MEDLINE

## (undated) DEVICE — SINGLE-USE BIOPSY FORCEPS: Brand: RADIAL JAW 4

## (undated) DEVICE — SENSR O2 OXIMAX FNGR A/ 18IN NONSTR

## (undated) DEVICE — Device

## (undated) DEVICE — ADAPT CLN BIOGUARD AIR/H2O DISP

## (undated) DEVICE — CATH DIAG IMPULSE FR5 5F 100CM

## (undated) DEVICE — GW EMR FIX EXCHG J STD .035 3MM 260CM

## (undated) DEVICE — LN SMPL CO2 SHTRM SD STREAM W/M LUER